# Patient Record
Sex: FEMALE | Race: WHITE | URBAN - METROPOLITAN AREA
[De-identification: names, ages, dates, MRNs, and addresses within clinical notes are randomized per-mention and may not be internally consistent; named-entity substitution may affect disease eponyms.]

---

## 2023-03-24 LAB
ANION GAP IN SER/PLAS: 10 MMOL/L (ref 10–20)
BASOPHILS (10*3/UL) IN BLOOD BY AUTOMATED COUNT: 0.04 X10E9/L (ref 0–0.1)
BASOPHILS/100 LEUKOCYTES IN BLOOD BY AUTOMATED COUNT: 0.8 % (ref 0–2)
CALCIUM (MG/DL) IN SER/PLAS: 9.3 MG/DL (ref 8.6–10.3)
CARBON DIOXIDE, TOTAL (MMOL/L) IN SER/PLAS: 26 MMOL/L (ref 21–32)
CHLORIDE (MMOL/L) IN SER/PLAS: 108 MMOL/L (ref 98–107)
CREATININE (MG/DL) IN SER/PLAS: 0.77 MG/DL (ref 0.5–1.05)
EOSINOPHILS (10*3/UL) IN BLOOD BY AUTOMATED COUNT: 0.07 X10E9/L (ref 0–0.4)
EOSINOPHILS/100 LEUKOCYTES IN BLOOD BY AUTOMATED COUNT: 1.4 % (ref 0–6)
ERYTHROCYTE DISTRIBUTION WIDTH (RATIO) BY AUTOMATED COUNT: 13.4 % (ref 11.5–14.5)
ERYTHROCYTE MEAN CORPUSCULAR HEMOGLOBIN CONCENTRATION (G/DL) BY AUTOMATED: 31.5 G/DL (ref 32–36)
ERYTHROCYTE MEAN CORPUSCULAR VOLUME (FL) BY AUTOMATED COUNT: 104 FL (ref 80–100)
ERYTHROCYTES (10*6/UL) IN BLOOD BY AUTOMATED COUNT: 3.81 X10E12/L (ref 4–5.2)
GFR FEMALE: 82 ML/MIN/1.73M2
GLUCOSE (MG/DL) IN SER/PLAS: 87 MG/DL (ref 74–99)
HEMATOCRIT (%) IN BLOOD BY AUTOMATED COUNT: 39.7 % (ref 36–46)
HEMOGLOBIN (G/DL) IN BLOOD: 12.5 G/DL (ref 12–16)
IMMATURE GRANULOCYTES/100 LEUKOCYTES IN BLOOD BY AUTOMATED COUNT: 0.2 % (ref 0–0.9)
LEUKOCYTES (10*3/UL) IN BLOOD BY AUTOMATED COUNT: 5.2 X10E9/L (ref 4.4–11.3)
LITHIUM (MOL/L) IN SER/PLAS: 0.45 MMOL/L (ref 0.6–1.2)
LYMPHOCYTES (10*3/UL) IN BLOOD BY AUTOMATED COUNT: 1.57 X10E9/L (ref 0.8–3)
LYMPHOCYTES/100 LEUKOCYTES IN BLOOD BY AUTOMATED COUNT: 30.5 % (ref 13–44)
MONOCYTES (10*3/UL) IN BLOOD BY AUTOMATED COUNT: 0.4 X10E9/L (ref 0.05–0.8)
MONOCYTES/100 LEUKOCYTES IN BLOOD BY AUTOMATED COUNT: 7.8 % (ref 2–10)
NEUTROPHILS (10*3/UL) IN BLOOD BY AUTOMATED COUNT: 3.06 X10E9/L (ref 1.6–5.5)
NEUTROPHILS/100 LEUKOCYTES IN BLOOD BY AUTOMATED COUNT: 59.3 % (ref 40–80)
PLATELETS (10*3/UL) IN BLOOD AUTOMATED COUNT: 168 X10E9/L (ref 150–450)
POTASSIUM (MMOL/L) IN SER/PLAS: 4.2 MMOL/L (ref 3.5–5.3)
SODIUM (MMOL/L) IN SER/PLAS: 140 MMOL/L (ref 136–145)
UREA NITROGEN (MG/DL) IN SER/PLAS: 15 MG/DL (ref 6–23)

## 2023-05-06 LAB — URINE CULTURE: NORMAL

## 2023-05-12 LAB — THYROTROPIN (MIU/L) IN SER/PLAS BY DETECTION LIMIT <= 0.05 MIU/L: 1.73 MIU/L (ref 0.44–3.98)

## 2023-09-13 PROBLEM — R94.30 ABNORMAL RESULTS OF CARDIOVASCULAR FUNCTION STUDIES: Status: ACTIVE | Noted: 2023-09-13

## 2023-09-13 PROBLEM — F32.A DEPRESSION: Status: ACTIVE | Noted: 2023-09-13

## 2023-09-13 PROBLEM — M85.80 OSTEOPENIA: Status: ACTIVE | Noted: 2023-09-13

## 2023-09-13 PROBLEM — D64.9 ANEMIA: Status: ACTIVE | Noted: 2023-09-13

## 2023-09-13 PROBLEM — E03.9 HYPOTHYROIDISM: Status: ACTIVE | Noted: 2023-09-13

## 2023-09-13 PROBLEM — E55.9 VITAMIN D INSUFFICIENCY: Status: ACTIVE | Noted: 2023-09-13

## 2023-09-13 PROBLEM — E78.5 HYPERLIPIDEMIA: Status: ACTIVE | Noted: 2023-09-13

## 2023-09-13 PROBLEM — F31.30 BIPOLAR AFFECTIVE DISORDER, CURRENT EPISODE DEPRESSED (MULTI): Status: ACTIVE | Noted: 2023-09-13

## 2023-09-13 PROBLEM — I10 HYPERTENSION: Status: ACTIVE | Noted: 2023-09-13

## 2023-09-13 PROBLEM — R94.6 ABNORMAL THYROID FUNCTION TEST: Status: ACTIVE | Noted: 2023-09-13

## 2023-09-13 RX ORDER — CEPHRADINE 500 MG
CAPSULE ORAL
COMMUNITY
Start: 2018-04-12

## 2023-09-13 RX ORDER — ASPIRIN 81 MG/1
1 TABLET ORAL DAILY
COMMUNITY
Start: 2013-12-31

## 2023-09-13 RX ORDER — DULOXETIN HYDROCHLORIDE 30 MG/1
CAPSULE, DELAYED RELEASE ORAL
COMMUNITY
Start: 2023-02-20 | End: 2023-10-16 | Stop reason: SDUPTHER

## 2023-09-13 RX ORDER — DULOXETIN HYDROCHLORIDE 60 MG/1
1 CAPSULE, DELAYED RELEASE ORAL 2 TIMES DAILY
COMMUNITY
Start: 2014-02-19 | End: 2023-10-16 | Stop reason: ALTCHOICE

## 2023-09-13 RX ORDER — LANOLIN ALCOHOL/MO/W.PET/CERES
1 CREAM (GRAM) TOPICAL DAILY
COMMUNITY

## 2023-09-13 RX ORDER — LOSARTAN POTASSIUM 100 MG/1
1 TABLET ORAL DAILY
COMMUNITY
Start: 2013-10-08 | End: 2024-02-14 | Stop reason: SDUPTHER

## 2023-09-13 RX ORDER — NITROFURANTOIN MONOHYDRATE/MACROCRYSTALLINE 25; 75 MG/1; MG/1
1 CAPSULE ORAL
COMMUNITY
Start: 2018-04-04

## 2023-09-13 RX ORDER — ARIPIPRAZOLE 2 MG/1
1 TABLET ORAL DAILY
COMMUNITY
Start: 2022-06-20 | End: 2023-10-16 | Stop reason: SDUPTHER

## 2023-09-13 RX ORDER — LITHIUM CARBONATE 150 MG/1
CAPSULE ORAL
COMMUNITY
End: 2023-10-16 | Stop reason: SDUPTHER

## 2023-09-13 RX ORDER — ATORVASTATIN CALCIUM 10 MG/1
TABLET, FILM COATED ORAL
COMMUNITY
Start: 2021-08-17 | End: 2023-12-22 | Stop reason: SDUPTHER

## 2023-09-13 RX ORDER — LEVOTHYROXINE SODIUM 88 UG/1
TABLET ORAL
COMMUNITY
Start: 2013-05-14 | End: 2023-10-31 | Stop reason: DRUGHIGH

## 2023-09-14 LAB
ALANINE AMINOTRANSFERASE (SGPT) (U/L) IN SER/PLAS: 16 U/L (ref 7–45)
ALBUMIN (G/DL) IN SER/PLAS: 4 G/DL (ref 3.4–5)
ALKALINE PHOSPHATASE (U/L) IN SER/PLAS: 48 U/L (ref 33–136)
ANION GAP IN SER/PLAS: 11 MMOL/L (ref 10–20)
ASPARTATE AMINOTRANSFERASE (SGOT) (U/L) IN SER/PLAS: 17 U/L (ref 9–39)
BASOPHILS (10*3/UL) IN BLOOD BY AUTOMATED COUNT: 0.05 X10E9/L (ref 0–0.1)
BASOPHILS/100 LEUKOCYTES IN BLOOD BY AUTOMATED COUNT: 1.2 % (ref 0–2)
BILIRUBIN TOTAL (MG/DL) IN SER/PLAS: 1.1 MG/DL (ref 0–1.2)
CALCIUM (MG/DL) IN SER/PLAS: 9.2 MG/DL (ref 8.6–10.3)
CARBON DIOXIDE, TOTAL (MMOL/L) IN SER/PLAS: 25 MMOL/L (ref 21–32)
CHLORIDE (MMOL/L) IN SER/PLAS: 109 MMOL/L (ref 98–107)
CREATININE (MG/DL) IN SER/PLAS: 0.77 MG/DL (ref 0.5–1.05)
DEHYDROEPIANDROSTERONE SULFATE (DHEA-S) (UG/DL) IN SER/: 29 UG/DL (ref 13–130)
EOSINOPHILS (10*3/UL) IN BLOOD BY AUTOMATED COUNT: 0.14 X10E9/L (ref 0–0.4)
EOSINOPHILS/100 LEUKOCYTES IN BLOOD BY AUTOMATED COUNT: 3.2 % (ref 0–6)
ERYTHROCYTE DISTRIBUTION WIDTH (RATIO) BY AUTOMATED COUNT: 15 % (ref 11.5–14.5)
ERYTHROCYTE MEAN CORPUSCULAR HEMOGLOBIN CONCENTRATION (G/DL) BY AUTOMATED: 30.8 G/DL (ref 32–36)
ERYTHROCYTE MEAN CORPUSCULAR VOLUME (FL) BY AUTOMATED COUNT: 108 FL (ref 80–100)
ERYTHROCYTES (10*6/UL) IN BLOOD BY AUTOMATED COUNT: 3.44 X10E12/L (ref 4–5.2)
GFR FEMALE: 82 ML/MIN/1.73M2
GLUCOSE (MG/DL) IN SER/PLAS: 82 MG/DL (ref 74–99)
HEMATOCRIT (%) IN BLOOD BY AUTOMATED COUNT: 37 % (ref 36–46)
HEMOGLOBIN (G/DL) IN BLOOD: 11.4 G/DL (ref 12–16)
IMMATURE GRANULOCYTES/100 LEUKOCYTES IN BLOOD BY AUTOMATED COUNT: 0.2 % (ref 0–0.9)
LEUKOCYTES (10*3/UL) IN BLOOD BY AUTOMATED COUNT: 4.3 X10E9/L (ref 4.4–11.3)
LITHIUM (MOL/L) IN SER/PLAS: 0.56 MMOL/L (ref 0.6–1.2)
LYMPHOCYTES (10*3/UL) IN BLOOD BY AUTOMATED COUNT: 1.52 X10E9/L (ref 0.8–3)
LYMPHOCYTES/100 LEUKOCYTES IN BLOOD BY AUTOMATED COUNT: 35 % (ref 13–44)
MONOCYTES (10*3/UL) IN BLOOD BY AUTOMATED COUNT: 0.36 X10E9/L (ref 0.05–0.8)
MONOCYTES/100 LEUKOCYTES IN BLOOD BY AUTOMATED COUNT: 8.3 % (ref 2–10)
NEUTROPHILS (10*3/UL) IN BLOOD BY AUTOMATED COUNT: 2.26 X10E9/L (ref 1.6–5.5)
NEUTROPHILS/100 LEUKOCYTES IN BLOOD BY AUTOMATED COUNT: 52.1 % (ref 40–80)
PLATELETS (10*3/UL) IN BLOOD AUTOMATED COUNT: 180 X10E9/L (ref 150–450)
POTASSIUM (MMOL/L) IN SER/PLAS: 4.1 MMOL/L (ref 3.5–5.3)
PROTEIN TOTAL: 6.6 G/DL (ref 6.4–8.2)
SODIUM (MMOL/L) IN SER/PLAS: 141 MMOL/L (ref 136–145)
THYROTROPIN (MIU/L) IN SER/PLAS BY DETECTION LIMIT <= 0.05 MIU/L: 2.98 MIU/L (ref 0.44–3.98)
UREA NITROGEN (MG/DL) IN SER/PLAS: 11 MG/DL (ref 6–23)

## 2023-10-04 ENCOUNTER — LAB (OUTPATIENT)
Dept: LAB | Facility: LAB | Age: 73
End: 2023-10-04
Payer: MEDICARE

## 2023-10-04 DIAGNOSIS — Z00.00 ENCOUNTER FOR GENERAL ADULT MEDICAL EXAMINATION WITHOUT ABNORMAL FINDINGS: ICD-10-CM

## 2023-10-04 DIAGNOSIS — E03.9 HYPOTHYROIDISM, UNSPECIFIED: ICD-10-CM

## 2023-10-04 DIAGNOSIS — E66.9 OBESITY, UNSPECIFIED: ICD-10-CM

## 2023-10-04 DIAGNOSIS — F31.30 BIPOLAR DISORDER, CURRENT EPISODE DEPRESSED, MILD OR MODERATE SEVERITY, UNSPECIFIED (MULTI): Primary | ICD-10-CM

## 2023-10-04 DIAGNOSIS — E78.5 HYPERLIPIDEMIA, UNSPECIFIED: ICD-10-CM

## 2023-10-04 DIAGNOSIS — I10 ESSENTIAL (PRIMARY) HYPERTENSION: ICD-10-CM

## 2023-10-04 LAB
ALBUMIN SERPL BCP-MCNC: 4 G/DL (ref 3.4–5)
ALP SERPL-CCNC: 51 U/L (ref 33–136)
ALT SERPL W P-5'-P-CCNC: 12 U/L (ref 7–45)
ANION GAP SERPL CALC-SCNC: 11 MMOL/L (ref 10–20)
AST SERPL W P-5'-P-CCNC: 14 U/L (ref 9–39)
BASOPHILS # BLD AUTO: 0.04 X10*3/UL (ref 0–0.1)
BASOPHILS NFR BLD AUTO: 0.9 %
BILIRUB SERPL-MCNC: 1.1 MG/DL (ref 0–1.2)
BUN SERPL-MCNC: 20 MG/DL (ref 6–23)
CALCIUM SERPL-MCNC: 9.8 MG/DL (ref 8.6–10.6)
CHLORIDE SERPL-SCNC: 109 MMOL/L (ref 98–107)
CHOLEST SERPL-MCNC: 174 MG/DL (ref 0–199)
CHOLESTEROL/HDL RATIO: 2
CO2 SERPL-SCNC: 24 MMOL/L (ref 21–32)
CREAT SERPL-MCNC: 0.74 MG/DL (ref 0.5–1.05)
EOSINOPHIL # BLD AUTO: 0.07 X10*3/UL (ref 0–0.4)
EOSINOPHIL NFR BLD AUTO: 1.5 %
ERYTHROCYTE [DISTWIDTH] IN BLOOD BY AUTOMATED COUNT: 14.3 % (ref 11.5–14.5)
GFR SERPL CREATININE-BSD FRML MDRD: 87 ML/MIN/1.73M*2
GLUCOSE SERPL-MCNC: 88 MG/DL (ref 74–99)
HCT VFR BLD AUTO: 39.5 % (ref 36–46)
HDLC SERPL-MCNC: 85.2 MG/DL
HGB BLD-MCNC: 12.2 G/DL (ref 12–16)
IMM GRANULOCYTES # BLD AUTO: 0.01 X10*3/UL (ref 0–0.5)
IMM GRANULOCYTES NFR BLD AUTO: 0.2 % (ref 0–0.9)
LDLC SERPL CALC-MCNC: 74 MG/DL (ref 140–190)
LITHIUM SERPL-SCNC: 0.65 MMOL/L (ref 0.6–1.2)
LYMPHOCYTES # BLD AUTO: 1.26 X10*3/UL (ref 0.8–3)
LYMPHOCYTES NFR BLD AUTO: 27.2 %
MCH RBC QN AUTO: 33 PG (ref 26–34)
MCHC RBC AUTO-ENTMCNC: 30.9 G/DL (ref 32–36)
MCV RBC AUTO: 107 FL (ref 80–100)
MONOCYTES # BLD AUTO: 0.34 X10*3/UL (ref 0.05–0.8)
MONOCYTES NFR BLD AUTO: 7.3 %
NEUTROPHILS # BLD AUTO: 2.92 X10*3/UL (ref 1.6–5.5)
NEUTROPHILS NFR BLD AUTO: 62.9 %
NON HDL CHOLESTEROL: 89 MG/DL (ref 0–149)
NRBC BLD-RTO: 0 /100 WBCS (ref 0–0)
PLATELET # BLD AUTO: 154 X10*3/UL (ref 150–450)
PMV BLD AUTO: 11.8 FL (ref 7.5–11.5)
POTASSIUM SERPL-SCNC: 4.4 MMOL/L (ref 3.5–5.3)
PROT SERPL-MCNC: 6.3 G/DL (ref 6.4–8.2)
RBC # BLD AUTO: 3.7 X10*6/UL (ref 4–5.2)
SODIUM SERPL-SCNC: 140 MMOL/L (ref 136–145)
TRIGL SERPL-MCNC: 74 MG/DL (ref 0–149)
TSH SERPL-ACNC: 2.23 MIU/L (ref 0.44–3.98)
VLDL: 15 MG/DL (ref 0–40)
WBC # BLD AUTO: 4.6 X10*3/UL (ref 4.4–11.3)

## 2023-10-04 PROCEDURE — 80050 GENERAL HEALTH PANEL: CPT

## 2023-10-04 PROCEDURE — 36415 COLL VENOUS BLD VENIPUNCTURE: CPT

## 2023-10-04 PROCEDURE — 80178 ASSAY OF LITHIUM: CPT

## 2023-10-04 PROCEDURE — 80061 LIPID PANEL: CPT

## 2023-10-16 ENCOUNTER — OFFICE VISIT (OUTPATIENT)
Dept: BEHAVIORAL HEALTH | Facility: CLINIC | Age: 73
End: 2023-10-16
Payer: MEDICARE

## 2023-10-16 VITALS
HEIGHT: 66 IN | RESPIRATION RATE: 18 BRPM | BODY MASS INDEX: 29.82 KG/M2 | DIASTOLIC BLOOD PRESSURE: 81 MMHG | TEMPERATURE: 97 F | HEART RATE: 64 BPM | SYSTOLIC BLOOD PRESSURE: 130 MMHG | WEIGHT: 185.56 LBS

## 2023-10-16 DIAGNOSIS — F31.30 BIPOLAR AFFECTIVE DISORDER, CURRENT EPISODE DEPRESSED, CURRENT EPISODE SEVERITY UNSPECIFIED (MULTI): Primary | ICD-10-CM

## 2023-10-16 PROCEDURE — 1126F AMNT PAIN NOTED NONE PRSNT: CPT | Performed by: PSYCHIATRY & NEUROLOGY

## 2023-10-16 PROCEDURE — 3079F DIAST BP 80-89 MM HG: CPT | Performed by: PSYCHIATRY & NEUROLOGY

## 2023-10-16 PROCEDURE — 99214 OFFICE O/P EST MOD 30 MIN: CPT | Performed by: PSYCHIATRY & NEUROLOGY

## 2023-10-16 PROCEDURE — 3075F SYST BP GE 130 - 139MM HG: CPT | Performed by: PSYCHIATRY & NEUROLOGY

## 2023-10-16 RX ORDER — LITHIUM CARBONATE 150 MG/1
CAPSULE ORAL
Qty: 360 CAPSULE | Refills: 1 | Status: SHIPPED | OUTPATIENT
Start: 2023-10-16 | End: 2024-03-18 | Stop reason: SDUPTHER

## 2023-10-16 RX ORDER — DULOXETIN HYDROCHLORIDE 30 MG/1
CAPSULE, DELAYED RELEASE ORAL
Qty: 180 CAPSULE | Refills: 1 | Status: SHIPPED | OUTPATIENT
Start: 2023-10-16 | End: 2024-03-18 | Stop reason: SDUPTHER

## 2023-10-16 RX ORDER — ARIPIPRAZOLE 2 MG/1
2 TABLET ORAL DAILY
Qty: 90 TABLET | Refills: 1 | Status: SHIPPED | OUTPATIENT
Start: 2023-10-16 | End: 2024-03-18 | Stop reason: SDUPTHER

## 2023-10-16 NOTE — PROGRESS NOTES
HPI: Ms. Li is assessed today in person. She accompanied by her , Tim. She  feels that her mood is good.   She had blood work done on 10/4. Her serum lithium level is within excellent range. Kidney and renal functioning are both good.    She describes her mood as a “8 or 9” on a 1-10 scale (10 is best). Her  concurs.   Since she saw me she saw her endocrinologist. I have reviewed this clinical summary    When I saw her last June we reduced her Cymbalta to 30 mg in the morning and 60 mg at bedtime. She did very well with this and does not feel any worse at all.       ROS:  She saw her family medicine group coverage clinician in Sept for bilateral swelling. Work up was negative with negative ultrasound  No falls  Energy level is fair  Appetite is good  Weight has been stable. She lost some weight prior to a wedding but has gained it back again.     Appetite: XX Normal/Unchanged  __Increase  _Decrease  SI:  ___ Present  XXAbsent  Sleep: XNormal/Unchanged  __ Increase _Decrease  HI:  ___ Present  XX Absent  Energy: X Normal/Unchanged  __ Increase _ Decrease  Plan:  ___ Present  XX Absent  Patient is: ___ able ___ not able to contract for safety  XXN/A Aggression:   ___ Yes       XX No  Medication Side Effects (SE):  __ None (Psych. Meds.)  ___ Other ___    Explain positives below.     Constitutional :   Eyes            Pos___  Ears/Nose/Mouth/Throat     Pos__________  CV          Pos__________  Respiratory          Pos__________  GI        Pos__________       Pos_   Musculoskeletal        Pos. Has been getting cortisone shots for the  chronic pain in her shoulder.  Gets shots every couple of months  Skin/Breast         Pos__________  Neurological         Pos__________  Endocrine      Pos__________  Heme/Lymph         Pos__________  Allergic Immunologic   Pos__________      Medications:  Lithium 150 mg. She is taking 4 tablets at bedtime  Duloxetine 30 mg in the morning and 60 mg at  bedtimzulay  Abilify 2 mg/day    Social history: She is enjoying her snf    MSE:  General impression: Alert/bright spontaneous. Engaged in the interview.   Speech: clear, coherent. Her speech is normal rate/rhythm  Cognition: grossly intact.   Mood: Good/upbeat.   She describes here mood as an “8” on a 1-10 scale (10 is the best).  Her  agrees with this.  Thought form/content: no psychotic symptoms. She denies hallucinations. Thinking is linear and goal-oriented  Cognition: Grossly intact  Fund of knowledge: excellent  Insight: good  Mrs. ROJAS does not appear to be an acute risk of harm to self or others. She has no thoughts that life is not worth living.      TREATMENT/PLAN: XX Continue Current Medications.  . ___Continue Follow-Up ___Continue Labs ___ Other    PATIENT RESPONSE TO TREATMENT: Excellent response to lithium.   Risks, benefits, Side Effects, Drug-to-Drug Interactions and Alternatives to treatment were discussed in my usual manner: ____ XXYes ____No    Reason for not reducing medication dose(s):       ___XX_N/A __ High risk of patient's deterioration ___ Medication recently reduced      ___Prior Medication Dose Reduction Unsuccessful ____Other     Complexity Issues:   # of diagnoses or management options:  ____Limited   XXMultiple     Problems: (gait, hearing, vision, etc.) effect on treatment and management:   ____Yes      XX No    Risk of complications and/or morbidity or mortality: ____None    ____ Limited   XX Moderate  ____  Severe    Topics discussed:   X_Nature of diagnosis and/or prognosis                                                           X  Medical records reviewed  X_Aspects of aging process and relationship to the current problem               ___Communication with patient's Dr Zhong of possible treatment options/drug drug interaction                         ___Communication with facility staff  X_Risk of non-treatment                                                                                    _X_Communication with family/caregiver  __X_Psychopharmacologic treatment options/possible benefits and risks           ___Referred for psychotherapy  ___Nature of, reasons for and possible benefits from psychotherapy               ___ Forms/reports filled out  ___Family and/or situational stressors                                                               ___Other  X Behavioral and/or environmental changed that might help      Impression:  Type 1 Bipolar disorder with euthymic mood. She is doing well on low-dose Abilify (2 mg/day).  In March we slightly reduced her Cymbalta and I believe that it is reasonable to reduce it still a bit further given her age and clinical status. I would like her to get repeat blood work in approximately 4-5 months. I discussed this with Annette and her  and both agre3. Total visit today 30 mg/day      Plan for Mrs. Annette Li as June 5, 2023:   Reduce duloxetine (Cymbalta) to 30 mg twice daily  Continue lithium 150 mg/Take 4 tablets in the evening/bedtime   Continue Abilify (aripiprazole) 2 mg/day  Get blood work done: basic metabolic panel, CDC with differential, lithium level approximately 2-3 weeks prior to your next visit with me  Follow up with Dr. Sharp in 4-5 months for an in-person visit

## 2023-10-31 ENCOUNTER — OFFICE VISIT (OUTPATIENT)
Dept: PRIMARY CARE | Facility: CLINIC | Age: 73
End: 2023-10-31
Payer: MEDICARE

## 2023-10-31 VITALS
TEMPERATURE: 97.9 F | HEART RATE: 63 BPM | DIASTOLIC BLOOD PRESSURE: 86 MMHG | SYSTOLIC BLOOD PRESSURE: 130 MMHG | BODY MASS INDEX: 30.08 KG/M2 | HEIGHT: 66 IN | WEIGHT: 187.2 LBS

## 2023-10-31 DIAGNOSIS — R60.0 BILATERAL LEG EDEMA: Primary | ICD-10-CM

## 2023-10-31 PROCEDURE — 3079F DIAST BP 80-89 MM HG: CPT | Performed by: FAMILY MEDICINE

## 2023-10-31 PROCEDURE — 1160F RVW MEDS BY RX/DR IN RCRD: CPT | Performed by: FAMILY MEDICINE

## 2023-10-31 PROCEDURE — 99213 OFFICE O/P EST LOW 20 MIN: CPT | Performed by: FAMILY MEDICINE

## 2023-10-31 PROCEDURE — 1126F AMNT PAIN NOTED NONE PRSNT: CPT | Performed by: FAMILY MEDICINE

## 2023-10-31 PROCEDURE — 1036F TOBACCO NON-USER: CPT | Performed by: FAMILY MEDICINE

## 2023-10-31 PROCEDURE — 3075F SYST BP GE 130 - 139MM HG: CPT | Performed by: FAMILY MEDICINE

## 2023-10-31 PROCEDURE — 1159F MED LIST DOCD IN RCRD: CPT | Performed by: FAMILY MEDICINE

## 2023-10-31 RX ORDER — LEVOTHYROXINE SODIUM 100 UG/1
100 TABLET ORAL
COMMUNITY
End: 2024-04-19 | Stop reason: SDUPTHER

## 2023-10-31 ASSESSMENT — ENCOUNTER SYMPTOMS
HEMATOLOGIC/LYMPHATIC NEGATIVE: 1
GASTROINTESTINAL NEGATIVE: 1
ENDOCRINE NEGATIVE: 1
ALLERGIC/IMMUNOLOGIC NEGATIVE: 1
CONSTITUTIONAL NEGATIVE: 1
MUSCULOSKELETAL NEGATIVE: 1
RESPIRATORY NEGATIVE: 1
EYES NEGATIVE: 1
PSYCHIATRIC NEGATIVE: 1
NEUROLOGICAL NEGATIVE: 1

## 2023-10-31 NOTE — PROGRESS NOTES
Bri Bryant is here for follow-up on lower leg edema.  Around September 9, 2023 she was doing a lot of walking on uneven surfaces leading in the next day noticed swelling of both ankles and feet.  She went to a med center and was told to wear compression stockings which did help improve the swelling.  She then saw Dr. Chris Garcia who ordered a venous duplex scan which was normal and labs which were normal as noted.  At this point the swelling is much better and she has been using compression stockings rarely.  There does seem to be some swelling towards the end of the day which resolves by morning.  She denies any chest pain or shortness of breath.  Past medical and social histories noted.  She continues on her meds noted.    Patient ID: Annette Li is a 72 y.o. female who presents for Follow-up (Follow up on bilateral ankle and feet swelling.  She went to Urgent care, then saw Dr Anthony, in which an ultrasound was done.  She told by both to wear compression stockings to reduce swelling.  It did help.  She no longer is having swelling and no longer wearing the stocking.):    Problem List Items Addressed This Visit    None     Past Medical History:   Diagnosis Date    Atherosclerotic heart disease of native coronary artery without angina pectoris     Coronary artery disease    Body mass index (BMI)30.0-30.9, adult     BMI 30.0-30.9,adult    Chronic sinusitis, unspecified 09/25/2017    Sinobronchitis    Personal history of other diseases of the nervous system and sense organs     History of sleep apnea    Personal history of other endocrine, nutritional and metabolic disease     History of hypothyroidism    Personal history of other endocrine, nutritional and metabolic disease     History of obesity    Unspecified asthma, uncomplicated 02/14/2018    Asthmatic bronchitis      Past Surgical History:   Procedure Laterality Date    CHOLECYSTECTOMY  07/31/2014    Cholecystectomy    OTHER SURGICAL HISTORY   07/31/2014    Oophorectomy Unilateral Left Side    OTHER SURGICAL HISTORY  02/01/2022    Gallbladder surgery    OTHER SURGICAL HISTORY  03/25/2022    Complete colonoscopy      Family History   Problem Relation Name Age of Onset    Heart block Mother      Hypertension Mother      Other (systemic lupus erythematosus) Mother      Other (cardiac disorder) Father      Diabetes Father      Hypertension Father      Other (systemic lupus erthematossus) Father        Social History     Socioeconomic History    Marital status:      Spouse name: Not on file    Number of children: Not on file    Years of education: Not on file    Highest education level: Not on file   Occupational History    Not on file   Tobacco Use    Smoking status: Never    Smokeless tobacco: Never   Substance and Sexual Activity    Alcohol use: Yes     Comment: occassional    Drug use: Never    Sexual activity: Not on file   Other Topics Concern    Not on file   Social History Narrative    Not on file     Social Determinants of Health     Financial Resource Strain: Not on file   Food Insecurity: Not on file   Transportation Needs: Not on file   Physical Activity: Not on file   Stress: Not on file   Social Connections: Not on file   Intimate Partner Violence: Not on file   Housing Stability: Not on file      Divalproex and Morphine   Current Outpatient Medications   Medication Sig Dispense Refill    ARIPiprazole (Abilify) 2 mg tablet Take 1 tablet (2 mg) by mouth once daily. 90 tablet 1    aspirin 81 mg EC tablet Take 1 tablet (81 mg) by mouth once daily.      atorvastatin (Lipitor) 10 mg tablet Take by mouth.      cholecalciferol, vitamin D3, 250 mcg (10,000 unit) capsule Take by mouth.      cyanocobalamin (Vitamin B-12) 1,000 mcg tablet Take 1 tablet (1,000 mcg) by mouth once daily.      DULoxetine (Cymbalta) 30 mg DR capsule Take 1 twice daily 180 capsule 1    levothyroxine (Synthroid, Levoxyl) 100 mcg tablet Take 1 tablet (100 mcg) by mouth once  daily in the morning. Take before meals.      lithium 150 mg capsule Take 4 at bedtime 360 capsule 1    losartan (Cozaar) 100 mg tablet Take 1 tablet (100 mg) by mouth once daily.      Macrobid 100 mg capsule Take 1 capsule (100 mg) by mouth 1 (one) time per week.       No current facility-administered medications for this visit.       Immunization History   Administered Date(s) Administered    Flu vaccine (IIV4), preservative free *Check age/dose* 09/13/2021    Flu vaccine, quadrivalent, high-dose, preservative free, age 65y+ (FLUZONE) 10/06/2022, 10/08/2023    Influenza, Unspecified 10/01/2021    Influenza, seasonal, injectable 10/01/2020    Pfizer COVID-19 vaccine, bivalent, age 12 years and older (30 mcg/0.3 mL) 10/06/2022    Pfizer Gray Cap SARS-CoV-2 06/24/2022    Pfizer Purple Cap SARS-CoV-2 02/22/2021, 03/13/2021, 04/02/2021, 11/05/2021, 11/17/2021, 04/13/2022    Pneumococcal conjugate vaccine, 13-valent (PREVNAR 13) 09/15/2017    Pneumococcal conjugate vaccine, 20-valent (PREVNAR 20) 05/13/2023    Pneumococcal polysaccharide vaccine, 23-valent, age 2 years and older (PNEUMOVAX 23) 10/15/2018    Tdap vaccine, age 7 year and older (BOOSTRIX) 11/04/2014    Zoster vaccine, recombinant, adult (SHINGRIX) 05/13/2023, 10/13/2023        Review of Systems   Constitutional: Negative.    HENT: Negative.     Eyes: Negative.    Respiratory: Negative.     Gastrointestinal: Negative.    Endocrine: Negative.    Genitourinary: Negative.    Musculoskeletal: Negative.    Allergic/Immunologic: Negative.    Neurological: Negative.    Hematological: Negative.    Psychiatric/Behavioral: Negative.     All other systems reviewed and are negative.       Vitals:    10/31/23 1140   BP: 130/86   Pulse: 63   Temp: 36.6 °C (97.9 °F)     Vitals:    10/31/23 1140   Weight: 84.9 kg (187 lb 3.2 oz)      Physical Exam  Constitutional:       General: She is not in acute distress.     Appearance: Normal appearance.   Cardiovascular:      Pulses:  Normal pulses.      Heart sounds: Normal heart sounds. No murmur heard.     No friction rub. No gallop.   Pulmonary:      Effort: Pulmonary effort is normal.      Breath sounds: Normal breath sounds. No wheezing or rales.   Neurological:      Mental Status: She is alert.     Right lower leg-there is mild edema just above the ankle area where her sock line was.  There is no actual swelling in the foot region.  There is no redness or induration.    ASSESSMENT/PLAN: Bilateral lower leg edema improved    Plan-we discussed her normal venous duplex scan.  We also discussed the potential for chronic venous insufficiency.  Discussed using knee-high compression stockings daily basis.  Elevate legs when possible.  Continue current medications  Call if swelling recurs or worsens.  Patient will be seeing her cardiologist in the next 1 to 2 months.  At this point there is no sign of heart failure.  Follow-up in 2 months and call as needed

## 2023-11-22 ENCOUNTER — LAB (OUTPATIENT)
Dept: LAB | Facility: LAB | Age: 73
End: 2023-11-22
Payer: MEDICARE

## 2023-11-22 DIAGNOSIS — N39.0 URINARY TRACT INFECTION, SITE NOT SPECIFIED: Primary | ICD-10-CM

## 2023-11-22 LAB
APPEARANCE UR: CLEAR
BACTERIA #/AREA URNS AUTO: ABNORMAL /HPF
BILIRUB UR STRIP.AUTO-MCNC: NEGATIVE MG/DL
COLOR UR: YELLOW
GLUCOSE UR STRIP.AUTO-MCNC: NEGATIVE MG/DL
KETONES UR STRIP.AUTO-MCNC: NEGATIVE MG/DL
LEUKOCYTE ESTERASE UR QL STRIP.AUTO: ABNORMAL
MUCOUS THREADS #/AREA URNS AUTO: ABNORMAL /LPF
NITRITE UR QL STRIP.AUTO: NEGATIVE
PH UR STRIP.AUTO: 7 [PH]
PROT UR STRIP.AUTO-MCNC: NEGATIVE MG/DL
RBC # UR STRIP.AUTO: ABNORMAL /UL
RBC #/AREA URNS AUTO: ABNORMAL /HPF
SP GR UR STRIP.AUTO: 1.01
UROBILINOGEN UR STRIP.AUTO-MCNC: <2 MG/DL
WBC #/AREA URNS AUTO: ABNORMAL /HPF

## 2023-11-22 PROCEDURE — 81001 URINALYSIS AUTO W/SCOPE: CPT

## 2023-11-22 PROCEDURE — 87086 URINE CULTURE/COLONY COUNT: CPT

## 2023-11-24 LAB — BACTERIA UR CULT: NORMAL

## 2023-12-22 ENCOUNTER — OFFICE VISIT (OUTPATIENT)
Dept: PRIMARY CARE | Facility: CLINIC | Age: 73
End: 2023-12-22
Payer: MEDICARE

## 2023-12-22 VITALS
HEIGHT: 66 IN | DIASTOLIC BLOOD PRESSURE: 84 MMHG | HEART RATE: 65 BPM | BODY MASS INDEX: 30.74 KG/M2 | OXYGEN SATURATION: 98 % | SYSTOLIC BLOOD PRESSURE: 116 MMHG | WEIGHT: 191.25 LBS | TEMPERATURE: 97.3 F

## 2023-12-22 DIAGNOSIS — G47.30 SLEEP APNEA, UNSPECIFIED TYPE: ICD-10-CM

## 2023-12-22 DIAGNOSIS — E78.5 HYPERLIPIDEMIA, UNSPECIFIED HYPERLIPIDEMIA TYPE: ICD-10-CM

## 2023-12-22 DIAGNOSIS — Z00.00 MEDICARE ANNUAL WELLNESS VISIT, SUBSEQUENT: ICD-10-CM

## 2023-12-22 DIAGNOSIS — Z78.9 NEVER SMOKED CIGARETTES: ICD-10-CM

## 2023-12-22 PROCEDURE — 1160F RVW MEDS BY RX/DR IN RCRD: CPT | Performed by: FAMILY MEDICINE

## 2023-12-22 PROCEDURE — 3074F SYST BP LT 130 MM HG: CPT | Performed by: FAMILY MEDICINE

## 2023-12-22 PROCEDURE — 1159F MED LIST DOCD IN RCRD: CPT | Performed by: FAMILY MEDICINE

## 2023-12-22 PROCEDURE — 1170F FXNL STATUS ASSESSED: CPT | Performed by: FAMILY MEDICINE

## 2023-12-22 PROCEDURE — 1036F TOBACCO NON-USER: CPT | Performed by: FAMILY MEDICINE

## 2023-12-22 PROCEDURE — 1126F AMNT PAIN NOTED NONE PRSNT: CPT | Performed by: FAMILY MEDICINE

## 2023-12-22 PROCEDURE — 3079F DIAST BP 80-89 MM HG: CPT | Performed by: FAMILY MEDICINE

## 2023-12-22 PROCEDURE — G0439 PPPS, SUBSEQ VISIT: HCPCS | Performed by: FAMILY MEDICINE

## 2023-12-22 PROCEDURE — 3008F BODY MASS INDEX DOCD: CPT | Performed by: FAMILY MEDICINE

## 2023-12-22 RX ORDER — ATORVASTATIN CALCIUM 10 MG/1
10 TABLET, FILM COATED ORAL DAILY
Qty: 90 TABLET | Refills: 1 | Status: SHIPPED | OUTPATIENT
Start: 2023-12-22

## 2023-12-22 ASSESSMENT — PATIENT HEALTH QUESTIONNAIRE - PHQ9
2. FEELING DOWN, DEPRESSED OR HOPELESS: NOT AT ALL
2. FEELING DOWN, DEPRESSED OR HOPELESS: NOT AT ALL
SUM OF ALL RESPONSES TO PHQ9 QUESTIONS 1 AND 2: 0
1. LITTLE INTEREST OR PLEASURE IN DOING THINGS: NOT AT ALL
1. LITTLE INTEREST OR PLEASURE IN DOING THINGS: NOT AT ALL
SUM OF ALL RESPONSES TO PHQ9 QUESTIONS 1 AND 2: 0

## 2023-12-22 ASSESSMENT — ACTIVITIES OF DAILY LIVING (ADL)
DOING_HOUSEWORK: INDEPENDENT
MANAGING_FINANCES: INDEPENDENT
GROCERY_SHOPPING: INDEPENDENT
TAKING_MEDICATION: INDEPENDENT
BATHING: INDEPENDENT
DRESSING: INDEPENDENT

## 2023-12-23 ASSESSMENT — ENCOUNTER SYMPTOMS
CARDIOVASCULAR NEGATIVE: 1
NEUROLOGICAL NEGATIVE: 1
RESPIRATORY NEGATIVE: 1
PSYCHIATRIC NEGATIVE: 1
MUSCULOSKELETAL NEGATIVE: 1
ALLERGIC/IMMUNOLOGIC NEGATIVE: 1
EYES NEGATIVE: 1
HEMATOLOGIC/LYMPHATIC NEGATIVE: 1
ENDOCRINE NEGATIVE: 1
CONSTITUTIONAL NEGATIVE: 1
GASTROINTESTINAL NEGATIVE: 1

## 2023-12-23 NOTE — PROGRESS NOTES
Subjective is here for her annual wellness visit.  She states that she is feeling well and has no new complaints at the present time.  Her lower extremity swelling noted 2 or 3 months ago has resolved.  She continues on her medications as noted.  She does see her psychiatrist and endocrinologist as scheduled.  She also sees Dr. Majano for cardiology care.  She continues on her meds noted she is scheduled for colonoscopy January 2024.  She does have a history of sleep apnea but cannot tolerate CPAP masks.    Patient ID: Annette Li is a 73 y.o. female who presents for Annual Exam (Patient in office today for AWV. ):    Problem List Items Addressed This Visit       Hyperlipidemia    Relevant Medications    atorvastatin (Lipitor) 10 mg tablet     Other Visit Diagnoses       Medicare annual wellness visit, subsequent        BMI 30.0-30.9,adult        Never smoked cigarettes        Sleep apnea, unspecified type        Relevant Orders    Referral to Adult Sleep Medicine           Past Medical History:   Diagnosis Date    Atherosclerotic heart disease of native coronary artery without angina pectoris     Coronary artery disease    Body mass index (BMI)30.0-30.9, adult     BMI 30.0-30.9,adult    Chronic sinusitis, unspecified 09/25/2017    Sinobronchitis    Personal history of other diseases of the nervous system and sense organs     History of sleep apnea    Personal history of other endocrine, nutritional and metabolic disease     History of hypothyroidism    Personal history of other endocrine, nutritional and metabolic disease     History of obesity    Unspecified asthma, uncomplicated 02/14/2018    Asthmatic bronchitis      Past Surgical History:   Procedure Laterality Date    CHOLECYSTECTOMY  07/31/2014    Cholecystectomy    OTHER SURGICAL HISTORY  07/31/2014    Oophorectomy Unilateral Left Side    OTHER SURGICAL HISTORY  02/01/2022    Gallbladder surgery    OTHER SURGICAL HISTORY  03/25/2022    Complete colonoscopy       Family History   Problem Relation Name Age of Onset    Heart block Mother      Hypertension Mother      Other (systemic lupus erythematosus) Mother      Other (cardiac disorder) Father      Diabetes Father      Hypertension Father      Other (systemic lupus erthematossus) Father        Social History     Socioeconomic History    Marital status:      Spouse name: Not on file    Number of children: Not on file    Years of education: Not on file    Highest education level: Not on file   Occupational History    Not on file   Tobacco Use    Smoking status: Never    Smokeless tobacco: Never   Substance and Sexual Activity    Alcohol use: Yes     Comment: occassional    Drug use: Never    Sexual activity: Not on file   Other Topics Concern    Not on file   Social History Narrative    Not on file     Social Determinants of Health     Financial Resource Strain: Not on file   Food Insecurity: Not on file   Transportation Needs: Not on file   Physical Activity: Not on file   Stress: Not on file   Social Connections: Not on file   Intimate Partner Violence: Not on file   Housing Stability: Not on file      Divalproex and Morphine   Current Outpatient Medications   Medication Sig Dispense Refill    ARIPiprazole (Abilify) 2 mg tablet Take 1 tablet (2 mg) by mouth once daily. 90 tablet 1    aspirin 81 mg EC tablet Take 1 tablet (81 mg) by mouth once daily.      cholecalciferol, vitamin D3, 250 mcg (10,000 unit) capsule Take by mouth.      cyanocobalamin (Vitamin B-12) 1,000 mcg tablet Take 1 tablet (1,000 mcg) by mouth once daily.      DULoxetine (Cymbalta) 30 mg DR capsule Take 1 twice daily 180 capsule 1    levothyroxine (Synthroid, Levoxyl) 100 mcg tablet Take 1 tablet (100 mcg) by mouth once daily in the morning. Take before meals.      lithium 150 mg capsule Take 4 at bedtime 360 capsule 1    losartan (Cozaar) 100 mg tablet Take 1 tablet (100 mg) by mouth once daily.      Macrobid 100 mg capsule Take 1 capsule  (100 mg) by mouth 1 (one) time per week.      atorvastatin (Lipitor) 10 mg tablet Take 1 tablet (10 mg) by mouth once daily. 90 tablet 1     No current facility-administered medications for this visit.       Immunization History   Administered Date(s) Administered    Flu vaccine (IIV4), preservative free *Check age/dose* 09/13/2021    Flu vaccine, quadrivalent, high-dose, preservative free, age 65y+ (FLUZONE) 10/06/2022, 10/08/2023    Influenza, Unspecified 10/01/2021    Influenza, seasonal, injectable 10/01/2020    Pfizer COVID-19 vaccine, Fall 2023, 12 years and older, (30mcg/0.3mL) 10/30/2023    Pfizer COVID-19 vaccine, bivalent, age 12 years and older (30 mcg/0.3 mL) 10/06/2022    Pfizer Gray Cap SARS-CoV-2 06/24/2022    Pfizer Purple Cap SARS-CoV-2 02/22/2021, 03/13/2021, 04/02/2021, 11/05/2021, 11/17/2021, 04/13/2022    Pneumococcal conjugate vaccine, 13-valent (PREVNAR 13) 09/15/2017    Pneumococcal conjugate vaccine, 20-valent (PREVNAR 20) 05/13/2023    Pneumococcal polysaccharide vaccine, 23-valent, age 2 years and older (PNEUMOVAX 23) 10/15/2018    Tdap vaccine, age 7 year and older (BOOSTRIX) 11/04/2014    Zoster vaccine, recombinant, adult (SHINGRIX) 05/13/2023, 10/13/2023        Review of Systems   Constitutional: Negative.    HENT: Negative.     Eyes: Negative.    Respiratory: Negative.     Cardiovascular: Negative.    Gastrointestinal: Negative.    Endocrine: Negative.    Genitourinary: Negative.    Musculoskeletal: Negative.    Skin: Negative.    Allergic/Immunologic: Negative.    Neurological: Negative.    Hematological: Negative.    Psychiatric/Behavioral: Negative.     All other systems reviewed and are negative.       Vitals:    12/22/23 1113   BP: 116/84   Pulse: 65   Temp: 36.3 °C (97.3 °F)   SpO2: 98%     Vitals:    12/22/23 1113   Weight: 86.8 kg (191 lb 4 oz)      Physical Exam  Constitutional:       General: She is not in acute distress.     Appearance: Normal appearance.   Neck:       Vascular: No carotid bruit.   Cardiovascular:      Rate and Rhythm: Normal rate and regular rhythm.      Pulses: Normal pulses.      Heart sounds: Normal heart sounds. No murmur heard.     No friction rub. No gallop.   Pulmonary:      Effort: Pulmonary effort is normal.      Breath sounds: Normal breath sounds. No wheezing or rales.   Musculoskeletal:      Cervical back: Neck supple.   Neurological:      Mental Status: She is alert.   Psychiatric:         Mood and Affect: Mood normal.         Thought Content: Thought content normal.          ASSESSMENT/PLAN: Annual wellness visit  Hypertension stable  Hyperlipidemia stable with cholesterol 174 and LDL 74  Hypothyroid  Bipolar disorder followed by psychiatry  Obstructive sleep apnea    Plan-continue current meds noted  Patient referred to  sleep medicine for evaluation of sleep apnea.  Proceed with colonoscopy as directed  Mammograms up-to-date per gynecology.  Follow-up with psychiatry endocrinology and cardiology as recommended exercise regularly  Immunizations up-to-date.  Recommended RSV vaccine  Exercise regularly  Follow-up in 6 months and call as needed

## 2024-01-19 ENCOUNTER — APPOINTMENT (OUTPATIENT)
Dept: ENDOCRINOLOGY | Facility: CLINIC | Age: 74
End: 2024-01-19
Payer: MEDICARE

## 2024-02-14 DIAGNOSIS — I10 PRIMARY HYPERTENSION: Primary | ICD-10-CM

## 2024-02-14 RX ORDER — LOSARTAN POTASSIUM 100 MG/1
100 TABLET ORAL DAILY
Qty: 90 TABLET | Refills: 1 | Status: SHIPPED | OUTPATIENT
Start: 2024-02-14

## 2024-02-26 ENCOUNTER — LAB (OUTPATIENT)
Dept: LAB | Facility: LAB | Age: 74
End: 2024-02-26
Payer: MEDICARE

## 2024-02-26 DIAGNOSIS — F31.30 BIPOLAR AFFECTIVE DISORDER, CURRENT EPISODE DEPRESSED, CURRENT EPISODE SEVERITY UNSPECIFIED (MULTI): ICD-10-CM

## 2024-02-26 LAB
ANION GAP SERPL CALC-SCNC: 12 MMOL/L (ref 10–20)
BUN SERPL-MCNC: 15 MG/DL (ref 6–23)
CALCIUM SERPL-MCNC: 9.4 MG/DL (ref 8.6–10.3)
CHLORIDE SERPL-SCNC: 106 MMOL/L (ref 98–107)
CO2 SERPL-SCNC: 26 MMOL/L (ref 21–32)
CREAT SERPL-MCNC: 0.82 MG/DL (ref 0.5–1.05)
EGFRCR SERPLBLD CKD-EPI 2021: 76 ML/MIN/1.73M*2
GLUCOSE SERPL-MCNC: 78 MG/DL (ref 74–99)
LITHIUM SERPL-SCNC: 0.68 MMOL/L (ref 0.6–1.2)
POTASSIUM SERPL-SCNC: 3.9 MMOL/L (ref 3.5–5.3)
SODIUM SERPL-SCNC: 140 MMOL/L (ref 136–145)

## 2024-02-26 PROCEDURE — 36415 COLL VENOUS BLD VENIPUNCTURE: CPT

## 2024-02-26 PROCEDURE — 80178 ASSAY OF LITHIUM: CPT

## 2024-02-26 PROCEDURE — 80048 BASIC METABOLIC PNL TOTAL CA: CPT

## 2024-03-04 ENCOUNTER — APPOINTMENT (OUTPATIENT)
Dept: BEHAVIORAL HEALTH | Facility: CLINIC | Age: 74
End: 2024-03-04
Payer: MEDICARE

## 2024-03-07 ENCOUNTER — LAB (OUTPATIENT)
Dept: LAB | Facility: LAB | Age: 74
End: 2024-03-07
Payer: MEDICARE

## 2024-03-07 DIAGNOSIS — N39.0 URINARY TRACT INFECTION, SITE NOT SPECIFIED: Primary | ICD-10-CM

## 2024-03-07 LAB
APPEARANCE UR: CLEAR
BACTERIA #/AREA URNS AUTO: ABNORMAL /HPF
BILIRUB UR STRIP.AUTO-MCNC: NEGATIVE MG/DL
COLOR UR: YELLOW
GLUCOSE UR STRIP.AUTO-MCNC: NEGATIVE MG/DL
KETONES UR STRIP.AUTO-MCNC: NEGATIVE MG/DL
LEUKOCYTE ESTERASE UR QL STRIP.AUTO: ABNORMAL
MUCOUS THREADS #/AREA URNS AUTO: ABNORMAL /LPF
NITRITE UR QL STRIP.AUTO: NEGATIVE
PH UR STRIP.AUTO: 7 [PH]
PROT UR STRIP.AUTO-MCNC: NEGATIVE MG/DL
RBC # UR STRIP.AUTO: ABNORMAL /UL
RBC #/AREA URNS AUTO: ABNORMAL /HPF
SP GR UR STRIP.AUTO: 1.01
SQUAMOUS #/AREA URNS AUTO: ABNORMAL /HPF
UROBILINOGEN UR STRIP.AUTO-MCNC: <2 MG/DL
WBC #/AREA URNS AUTO: ABNORMAL /HPF

## 2024-03-07 PROCEDURE — 81001 URINALYSIS AUTO W/SCOPE: CPT

## 2024-03-07 PROCEDURE — 87086 URINE CULTURE/COLONY COUNT: CPT

## 2024-03-08 LAB — BACTERIA UR CULT: NORMAL

## 2024-03-11 ENCOUNTER — APPOINTMENT (OUTPATIENT)
Dept: BEHAVIORAL HEALTH | Facility: CLINIC | Age: 74
End: 2024-03-11
Payer: MEDICARE

## 2024-03-18 ENCOUNTER — OFFICE VISIT (OUTPATIENT)
Dept: BEHAVIORAL HEALTH | Facility: CLINIC | Age: 74
End: 2024-03-18
Payer: MEDICARE

## 2024-03-18 VITALS
DIASTOLIC BLOOD PRESSURE: 87 MMHG | BODY MASS INDEX: 30.22 KG/M2 | SYSTOLIC BLOOD PRESSURE: 141 MMHG | HEIGHT: 66 IN | WEIGHT: 188 LBS | HEART RATE: 66 BPM

## 2024-03-18 DIAGNOSIS — F31.30 BIPOLAR AFFECTIVE DISORDER, CURRENT EPISODE DEPRESSED, CURRENT EPISODE SEVERITY UNSPECIFIED (MULTI): ICD-10-CM

## 2024-03-18 DIAGNOSIS — F31.32 BIPOLAR AFFECTIVE DISORDER, CURRENTLY DEPRESSED, MODERATE (MULTI): ICD-10-CM

## 2024-03-18 PROCEDURE — 1036F TOBACCO NON-USER: CPT | Performed by: PSYCHIATRY & NEUROLOGY

## 2024-03-18 PROCEDURE — 3077F SYST BP >= 140 MM HG: CPT | Performed by: PSYCHIATRY & NEUROLOGY

## 2024-03-18 PROCEDURE — 3079F DIAST BP 80-89 MM HG: CPT | Performed by: PSYCHIATRY & NEUROLOGY

## 2024-03-18 PROCEDURE — 1159F MED LIST DOCD IN RCRD: CPT | Performed by: PSYCHIATRY & NEUROLOGY

## 2024-03-18 PROCEDURE — 99214 OFFICE O/P EST MOD 30 MIN: CPT | Performed by: PSYCHIATRY & NEUROLOGY

## 2024-03-18 PROCEDURE — 1160F RVW MEDS BY RX/DR IN RCRD: CPT | Performed by: PSYCHIATRY & NEUROLOGY

## 2024-03-18 PROCEDURE — 3008F BODY MASS INDEX DOCD: CPT | Performed by: PSYCHIATRY & NEUROLOGY

## 2024-03-18 RX ORDER — ARIPIPRAZOLE 2 MG/1
2 TABLET ORAL DAILY
Qty: 90 TABLET | Refills: 1 | Status: SHIPPED | OUTPATIENT
Start: 2024-03-18

## 2024-03-18 RX ORDER — LITHIUM CARBONATE 150 MG/1
CAPSULE ORAL
Qty: 360 CAPSULE | Refills: 1 | Status: SHIPPED | OUTPATIENT
Start: 2024-03-18

## 2024-03-18 RX ORDER — DULOXETIN HYDROCHLORIDE 30 MG/1
CAPSULE, DELAYED RELEASE ORAL
Qty: 180 CAPSULE | Refills: 1 | Status: SHIPPED | OUTPATIENT
Start: 2024-03-18

## 2024-03-18 RX ORDER — LURASIDONE HYDROCHLORIDE 20 MG/1
TABLET, FILM COATED ORAL
COMMUNITY
Start: 2019-09-16

## 2024-03-18 RX ORDER — NITROFURANTOIN MACROCRYSTALS 50 MG/1
50 CAPSULE ORAL DAILY
COMMUNITY
Start: 2024-02-06

## 2024-03-18 NOTE — PROGRESS NOTES
HPI: Ms. Li is assessed today in person. As usual, she accompanied by her , Tim. She  feels that her mood is “pretty good”. She recently attended a family  and found this to be quite rewarding as she got a chance to meet up with family members she has seen in a long time.   She had blood work done in February. Her serum lithium level is within excellent range. Kidney and renal functioning are both good.    She describes her mood as a “8” on a 1-10 scale (10 is best).     When I saw her last  we reduced her Cymbalta to 30 mg in the morning and 60 mg at bedtime. She did very well with this and does not feel any worse at all.       ROS:  Says her sleep is “off and on” because she has to get up at night to use the bathroom. Sometimes it is hard for her to get back to sleep once she wakes up.  Had a colonoscopy in Feb. There were 5 polyps removed. She needs to have a follow up colonoscopy in 3 years.  She saw her  PCP and GI since she last saw me and I have reviewed these clinical summaries  No falls  Energy level is somewhat less than she would like. “I feel tired a lot”  Appetite is good  Weight is about the same    Appetite: XX Normal/Unchanged  __Increase  _Decrease  SI:  ___ Present  XXAbsent  Sleep: XNormal/Unchanged  __ Increase _Decrease  HI:  ___ Present  XX Absent  Energy: X Normal/Unchanged  __ Increase _ Decrease  Plan:  ___ Present  XX Absent  Patient is: ___ able ___ not able to contract for safety  XXN/A Aggression:   ___ Yes       XX No  Medication Side Effects (SE):  __ None (Psych. Meds.)  ___ Other ___    Explain positives below.     Constitutional :   Eyes            Pos___  Ears/Nose/Mouth/Throat     Pos__________  CV          Pos__________  Respiratory          Pos__________  GI        Pos__________       Pos_   Musculoskeletal        Pos. Is still getting cortisone shots for the  chronic pain in her shoulder.  Gets shots every couple of months and has an appt for May of  this year for another injection.  Skin/Breast         Pos__________  Neurological         Pos__________  Endocrine      Pos__________  Heme/Lymph         Pos__________  Allergic Immunologic   Pos__________      Medications:  Lithium 150 mg. She is taking 4 tablets at bedtime  Duloxetine 30 mg in the morning and 60 mg at bedtime  Abilify 2 mg/day    Social history: She is less active in the winter/stays inside when it is cold    MSE:  General impression: Alert/bright spontaneous. Engaged in the interview.   Speech: clear, coherent. Her speech is normal rate/rhythm  Cognition: grossly intact.   Mood: Good/upbeat.   She describes here mood as an “8” on a 1-10 scale (10 is the best).  Her  agrees with this.  Thought form/content: no psychotic symptoms. She denies hallucinations. Thinking is linear and goal-oriented  Cognition: Grossly intact  Fund of knowledge: excellent  Insight: good  Mrs. ROJAS does not appear to be an acute risk of harm to self or others. She has no thoughts that life is not worth living.      TREATMENT/PLAN: XX Continue Current Medications.  . ___Continue Follow-Up ___Continue Labs ___ Other    PATIENT RESPONSE TO TREATMENT: Excellent response to lithium.   Risks, benefits, Side Effects, Drug-to-Drug Interactions and Alternatives to treatment were discussed in my usual manner: ____ XXYes ____No    Reason for not reducing medication dose(s):       ___XX_N/A __ High risk of patient's deterioration ___ Medication recently reduced      ___Prior Medication Dose Reduction Unsuccessful ____Other     Complexity Issues:   # of diagnoses or management options:  ____Limited   XXMultiple     Problems: (gait, hearing, vision, etc.) effect on treatment and management:   ____Yes      XX No    Risk of complications and/or morbidity or mortality: ____None    ____ Limited   XX Moderate  ____  Severe    Topics discussed:   X_Nature of diagnosis and/or prognosis                                                            X  Medical records reviewed  X_Aspects of aging process and relationship to the current problem               ___Communication with patient's Dr Zhong of possible treatment options/drug drug interaction                         ___Communication with facility staff  X_Risk of non-treatment                                                                                   _X_Communication with family/caregiver  __X_Psychopharmacologic treatment options/possible benefits and risks           ___Referred for psychotherapy  ___Nature of, reasons for and possible benefits from psychotherapy               ___ Forms/reports filled out  ___Family and/or situational stressors                                                               ___Other  X Behavioral and/or environmental changed that might help      Impression:  Type 1 Bipolar disorder with euthymic mood. She is doing well on low-dose Abilify (2 mg/day).  In October of last year we slightly reduced her Cymbalta to 30 mg twice daily and she has done well with this. We discussed next steps. I would like to leave her on her current treatment regimen for now, but if she continues to do well with next follow up visit I will plan to reduce her Abilify to 1 mg/day with a plan to eventually stop this as tolerated. I would like her to get repeat blood work in approximately 4-5 months. I discussed this with Annette and her  and both agree. Total visit today 30 mg/day      Plan for Mrs. Annette Li as March 18, 2024:   Continue duloxetine (Cymbalta) to 30 mg twice daily  Continue lithium 150 mg/Take 4 tablets in the evening/bedtime   Continue Abilify (aripiprazole) 2 mg/day  Get blood work done: basic metabolic panel, CDC with differential, lithium level approximately 2 weeks prior to your next visit with me  If you are doing well on next visit we will try and reduce your Abilify dosage at that time  Follow up with Dr. Sharp in 4-5 months for an in-person  visit

## 2024-04-19 ENCOUNTER — OFFICE VISIT (OUTPATIENT)
Dept: ENDOCRINOLOGY | Facility: CLINIC | Age: 74
End: 2024-04-19
Payer: MEDICARE

## 2024-04-19 VITALS
DIASTOLIC BLOOD PRESSURE: 92 MMHG | HEIGHT: 66 IN | SYSTOLIC BLOOD PRESSURE: 145 MMHG | BODY MASS INDEX: 30.7 KG/M2 | HEART RATE: 70 BPM | WEIGHT: 191 LBS

## 2024-04-19 DIAGNOSIS — E03.9 HYPOTHYROIDISM, UNSPECIFIED TYPE: Primary | ICD-10-CM

## 2024-04-19 DIAGNOSIS — F31.30 BIPOLAR AFFECTIVE DISORDER, CURRENT EPISODE DEPRESSED, CURRENT EPISODE SEVERITY UNSPECIFIED (MULTI): ICD-10-CM

## 2024-04-19 DIAGNOSIS — D35.2 PITUITARY ADENOMA (MULTI): ICD-10-CM

## 2024-04-19 PROCEDURE — 1160F RVW MEDS BY RX/DR IN RCRD: CPT | Performed by: STUDENT IN AN ORGANIZED HEALTH CARE EDUCATION/TRAINING PROGRAM

## 2024-04-19 PROCEDURE — 1036F TOBACCO NON-USER: CPT | Performed by: STUDENT IN AN ORGANIZED HEALTH CARE EDUCATION/TRAINING PROGRAM

## 2024-04-19 PROCEDURE — 3008F BODY MASS INDEX DOCD: CPT | Performed by: STUDENT IN AN ORGANIZED HEALTH CARE EDUCATION/TRAINING PROGRAM

## 2024-04-19 PROCEDURE — 1159F MED LIST DOCD IN RCRD: CPT | Performed by: STUDENT IN AN ORGANIZED HEALTH CARE EDUCATION/TRAINING PROGRAM

## 2024-04-19 PROCEDURE — 3080F DIAST BP >= 90 MM HG: CPT | Performed by: STUDENT IN AN ORGANIZED HEALTH CARE EDUCATION/TRAINING PROGRAM

## 2024-04-19 PROCEDURE — 3077F SYST BP >= 140 MM HG: CPT | Performed by: STUDENT IN AN ORGANIZED HEALTH CARE EDUCATION/TRAINING PROGRAM

## 2024-04-19 PROCEDURE — 1126F AMNT PAIN NOTED NONE PRSNT: CPT | Performed by: STUDENT IN AN ORGANIZED HEALTH CARE EDUCATION/TRAINING PROGRAM

## 2024-04-19 PROCEDURE — 99214 OFFICE O/P EST MOD 30 MIN: CPT | Performed by: STUDENT IN AN ORGANIZED HEALTH CARE EDUCATION/TRAINING PROGRAM

## 2024-04-19 RX ORDER — LEVOTHYROXINE SODIUM 100 UG/1
TABLET ORAL
Qty: 90 TABLET | Refills: 3 | Status: SHIPPED | OUTPATIENT
Start: 2024-05-13

## 2024-04-19 ASSESSMENT — PAIN SCALES - GENERAL: PAINLEVEL: 0-NO PAIN

## 2024-04-19 NOTE — PROGRESS NOTES
73 F PMH: pituitary macroadenoma, hypothyroidism, depression     Coming in today for thyroid and pituitary, previously seen Dr. 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   Current regimen:   Levothyroxine 100mcg daily    On chronic lithium   No ultrasound or FNA in the past     Generally feels well with no specific complaints   Has poor sleep at night which is baseline         Past Medical History:   Diagnosis Date    Atherosclerotic heart disease of native coronary artery without angina pectoris     Coronary artery disease    Body mass index (BMI)30.0-30.9, adult     BMI 30.0-30.9,adult    Chronic sinusitis, unspecified 09/25/2017    Sinobronchitis    Personal history of other diseases of the nervous system and sense organs     History of sleep apnea    Personal history of other endocrine, nutritional and metabolic disease     History of hypothyroidism    Personal history of other endocrine, nutritional and metabolic disease     History of obesity    Unspecified asthma, uncomplicated ([facility]) 02/14/2018    Asthmatic bronchitis      Social History     Socioeconomic History    Marital status:      Spouse name: Not on file    Number of children: Not on file    Years of education: Not on file    Highest education level: Not on file   Occupational History    Not on file   Tobacco Use    Smoking status: Never    Smokeless tobacco: Never   Substance and Sexual Activity    Alcohol use: Yes     Comment: occassional    Drug use: Never    Sexual activity: Not on file   Other Topics Concern    Not on file   Social History Narrative    Not on file     Social Determinants of Health     Financial Resource Strain: Not on file    Food Insecurity: Not on file   Transportation Needs: Not on file   Physical Activity: Not on file   Stress: Not on file   Social Connections: Not on file   Intimate Partner Violence: Not on file   Housing Stability: Not on file      Family History   Problem Relation Name Age of Onset    Heart block Mother      Hypertension Mother      Other (systemic lupus erythematosus) Mother      Other (cardiac disorder) Father      Diabetes Father      Hypertension Father      Other (systemic lupus erthematossus) Father          ROS reviewed and is negative except for pertinent findings noted on HPI    Physical Exam  Constitutional:       Appearance: Normal appearance.   HENT:      Head: Normocephalic.   Eyes:      Extraocular Movements: Extraocular movements intact.   Neck:      Comments: No goiter or nodule palpated  Musculoskeletal:      Comments: Trace lower extremity edema    Skin:     General: Skin is warm.   Neurological:      General: No focal deficit present.      Mental Status: She is alert and oriented to person, place, and time.   Psychiatric:         Mood and Affect: Mood normal.         Behavior: Behavior normal.         labs and imaging reviewed, pertinent findings listed on HPI and Impression      Problem List Items Addressed This Visit       Hypothyroidism - Primary    Relevant Medications    levothyroxine (Synthroid, Levoxyl) 100 mcg tablet (Start on 5/13/2024)    Other Relevant Orders    Renal Function Panel    Bipolar affective disorder, current episode depressed (Multi)     Other Visit Diagnoses       Pituitary adenoma (Multi)        Relevant Orders    Adrenocorticotropic Hormone (ACTH)    FSH & LH    Estradiol    Cortisol    DHEA-Sulfate    Insulin-Like Growth Factor 1    Prolactin    Thyroid Stimulating Hormone    Thyroxine, Free           1) Pituitary microadeoma non funcitonal  No need for repeat imaging unless there is a change in clinical status  Pituitary labs now    2) continue levothryoxine 110mcg  daily    Follow up in one year

## 2024-04-19 NOTE — PATIENT INSTRUCTIONS
Do the labs fasting from midnight, get them done 8-9am in the morning     Continue levothyroxine 100mcg daily     Follow up in 1 year     Hien Messer MD  Divison of Endocrinology   [facility]   Phone: [phone]    option 4, then option 1  Fax: [phone]

## 2024-04-25 ENCOUNTER — LAB (OUTPATIENT)
Dept: LAB | Facility: LAB | Age: 74
End: 2024-04-25
Payer: MEDICARE

## 2024-04-25 DIAGNOSIS — F31.30 BIPOLAR AFFECTIVE DISORDER, CURRENT EPISODE DEPRESSED, CURRENT EPISODE SEVERITY UNSPECIFIED (MULTI): ICD-10-CM

## 2024-04-25 DIAGNOSIS — D35.2 PITUITARY ADENOMA (MULTI): ICD-10-CM

## 2024-04-25 DIAGNOSIS — E03.9 HYPOTHYROIDISM, UNSPECIFIED TYPE: ICD-10-CM

## 2024-04-25 LAB
ALBUMIN SERPL BCP-MCNC: 4 G/DL (ref 3.4–5)
ANION GAP SERPL CALC-SCNC: 9 MMOL/L (ref 10–20)
BASOPHILS # BLD AUTO: 0.04 X10*3/UL (ref 0–0.1)
BASOPHILS NFR BLD AUTO: 1 %
BUN SERPL-MCNC: 15 MG/DL (ref 6–23)
CALCIUM SERPL-MCNC: 9.3 MG/DL (ref 8.6–10.3)
CHLORIDE SERPL-SCNC: 108 MMOL/L (ref 98–107)
CO2 SERPL-SCNC: 28 MMOL/L (ref 21–32)
CORTIS SERPL-MCNC: 23.1 UG/DL (ref 2.5–20)
CREAT SERPL-MCNC: 0.8 MG/DL (ref 0.5–1.05)
DHEA-S SERPL-MCNC: 39 UG/DL (ref 13–130)
EGFRCR SERPLBLD CKD-EPI 2021: 78 ML/MIN/1.73M*2
EOSINOPHIL # BLD AUTO: 0.11 X10*3/UL (ref 0–0.4)
EOSINOPHIL NFR BLD AUTO: 2.7 %
ERYTHROCYTE [DISTWIDTH] IN BLOOD BY AUTOMATED COUNT: 14 % (ref 11.5–14.5)
ESTRADIOL SERPL-MCNC: 20 PG/ML
FSH SERPL-ACNC: 128.6 IU/L
GLUCOSE SERPL-MCNC: 89 MG/DL (ref 74–99)
HCT VFR BLD AUTO: 39.4 % (ref 36–46)
HGB BLD-MCNC: 12.7 G/DL (ref 12–16)
IMM GRANULOCYTES # BLD AUTO: 0.01 X10*3/UL (ref 0–0.5)
IMM GRANULOCYTES NFR BLD AUTO: 0.2 % (ref 0–0.9)
LH SERPL-ACNC: 65.5 IU/L
LITHIUM SERPL-SCNC: 0.72 MMOL/L (ref 0.6–1.2)
LYMPHOCYTES # BLD AUTO: 1.72 X10*3/UL (ref 0.8–3)
LYMPHOCYTES NFR BLD AUTO: 42.2 %
MCH RBC QN AUTO: 33.9 PG (ref 26–34)
MCHC RBC AUTO-ENTMCNC: 32.2 G/DL (ref 32–36)
MCV RBC AUTO: 105 FL (ref 80–100)
MONOCYTES # BLD AUTO: 0.3 X10*3/UL (ref 0.05–0.8)
MONOCYTES NFR BLD AUTO: 7.4 %
NEUTROPHILS # BLD AUTO: 1.9 X10*3/UL (ref 1.6–5.5)
NEUTROPHILS NFR BLD AUTO: 46.5 %
NRBC BLD-RTO: 0 /100 WBCS (ref 0–0)
PHOSPHATE SERPL-MCNC: 4.1 MG/DL (ref 2.5–4.9)
PLATELET # BLD AUTO: 162 X10*3/UL (ref 150–450)
POTASSIUM SERPL-SCNC: 4.4 MMOL/L (ref 3.5–5.3)
PROLACTIN SERPL-MCNC: 14.3 UG/L (ref 3–20)
RBC # BLD AUTO: 3.75 X10*6/UL (ref 4–5.2)
SODIUM SERPL-SCNC: 141 MMOL/L (ref 136–145)
T4 FREE SERPL-MCNC: 0.96 NG/DL (ref 0.61–1.12)
TSH SERPL-ACNC: 2.39 MIU/L (ref 0.44–3.98)
WBC # BLD AUTO: 4.1 X10*3/UL (ref 4.4–11.3)

## 2024-04-25 PROCEDURE — 36415 COLL VENOUS BLD VENIPUNCTURE: CPT

## 2024-04-25 PROCEDURE — 82024 ASSAY OF ACTH: CPT

## 2024-04-25 PROCEDURE — 85025 COMPLETE CBC W/AUTO DIFF WBC: CPT

## 2024-04-25 PROCEDURE — 82627 DEHYDROEPIANDROSTERONE: CPT

## 2024-04-25 PROCEDURE — 82670 ASSAY OF TOTAL ESTRADIOL: CPT

## 2024-04-25 PROCEDURE — 82533 TOTAL CORTISOL: CPT

## 2024-04-25 PROCEDURE — 84305 ASSAY OF SOMATOMEDIN: CPT

## 2024-04-25 PROCEDURE — 83002 ASSAY OF GONADOTROPIN (LH): CPT

## 2024-04-25 PROCEDURE — 80178 ASSAY OF LITHIUM: CPT

## 2024-04-25 PROCEDURE — 84146 ASSAY OF PROLACTIN: CPT

## 2024-04-25 PROCEDURE — 80069 RENAL FUNCTION PANEL: CPT

## 2024-04-25 PROCEDURE — 84439 ASSAY OF FREE THYROXINE: CPT

## 2024-04-25 PROCEDURE — 84443 ASSAY THYROID STIM HORMONE: CPT

## 2024-04-25 PROCEDURE — 83001 ASSAY OF GONADOTROPIN (FSH): CPT

## 2024-04-27 LAB
ACTH PLAS-MCNC: 45 PG/ML (ref 7.2–63.3)
IGF-I SERPL-MCNC: 133 NG/ML (ref 24–222)
IGF-I Z-SCORE SERPL: 0.8

## 2024-06-24 ENCOUNTER — APPOINTMENT (OUTPATIENT)
Dept: PRIMARY CARE | Facility: CLINIC | Age: 74
End: 2024-06-24
Payer: MEDICARE

## 2024-06-24 ENCOUNTER — LAB (OUTPATIENT)
Dept: LAB | Facility: LAB | Age: 74
End: 2024-06-24
Payer: MEDICARE

## 2024-06-24 ENCOUNTER — TELEPHONE (OUTPATIENT)
Dept: OTHER | Age: 74
End: 2024-06-24

## 2024-06-24 DIAGNOSIS — F31.32 BIPOLAR AFFECTIVE DISORDER, CURRENTLY DEPRESSED, MODERATE (MULTI): ICD-10-CM

## 2024-06-24 DIAGNOSIS — N39.0 URINARY TRACT INFECTION, SITE NOT SPECIFIED: Primary | ICD-10-CM

## 2024-06-24 LAB
APPEARANCE UR: CLEAR
BILIRUB UR STRIP.AUTO-MCNC: NEGATIVE MG/DL
COLOR UR: ABNORMAL
GLUCOSE UR STRIP.AUTO-MCNC: NORMAL MG/DL
KETONES UR STRIP.AUTO-MCNC: NEGATIVE MG/DL
LEUKOCYTE ESTERASE UR QL STRIP.AUTO: NEGATIVE
MUCOUS THREADS #/AREA URNS AUTO: NORMAL /LPF
NITRITE UR QL STRIP.AUTO: NEGATIVE
PH UR STRIP.AUTO: 7 [PH]
PROT UR STRIP.AUTO-MCNC: NEGATIVE MG/DL
RBC # UR STRIP.AUTO: ABNORMAL /UL
RBC #/AREA URNS AUTO: NORMAL /HPF
SP GR UR STRIP.AUTO: 1.01
SQUAMOUS #/AREA URNS AUTO: NORMAL /HPF
UROBILINOGEN UR STRIP.AUTO-MCNC: NORMAL MG/DL
WBC #/AREA URNS AUTO: NORMAL /HPF

## 2024-06-24 PROCEDURE — 87086 URINE CULTURE/COLONY COUNT: CPT

## 2024-06-24 PROCEDURE — 81001 URINALYSIS AUTO W/SCOPE: CPT

## 2024-06-24 NOTE — TELEPHONE ENCOUNTER
Pt went to  lab earlier but was turned away due to not having order in chart.  Pt requesting this order ASAP and desk staff to call once it is in available thru  MY CHART

## 2024-06-25 ENCOUNTER — LAB (OUTPATIENT)
Dept: LAB | Facility: LAB | Age: 74
End: 2024-06-25
Payer: MEDICARE

## 2024-06-25 DIAGNOSIS — F31.32 BIPOLAR AFFECTIVE DISORDER, CURRENTLY DEPRESSED, MODERATE (MULTI): ICD-10-CM

## 2024-06-25 LAB
ANION GAP SERPL CALC-SCNC: 11 MMOL/L (ref 10–20)
BASOPHILS # BLD AUTO: 0.04 X10*3/UL (ref 0–0.1)
BASOPHILS NFR BLD AUTO: 0.7 %
BUN SERPL-MCNC: 15 MG/DL (ref 6–23)
CALCIUM SERPL-MCNC: 9.2 MG/DL (ref 8.6–10.3)
CHLORIDE SERPL-SCNC: 106 MMOL/L (ref 98–107)
CO2 SERPL-SCNC: 25 MMOL/L (ref 21–32)
CREAT SERPL-MCNC: 0.77 MG/DL (ref 0.5–1.05)
EGFRCR SERPLBLD CKD-EPI 2021: 82 ML/MIN/1.73M*2
EOSINOPHIL # BLD AUTO: 0.12 X10*3/UL (ref 0–0.4)
EOSINOPHIL NFR BLD AUTO: 2.2 %
ERYTHROCYTE [DISTWIDTH] IN BLOOD BY AUTOMATED COUNT: 13.4 % (ref 11.5–14.5)
GLUCOSE SERPL-MCNC: 86 MG/DL (ref 74–99)
HCT VFR BLD AUTO: 39.2 % (ref 36–46)
HGB BLD-MCNC: 12.2 G/DL (ref 12–16)
IMM GRANULOCYTES # BLD AUTO: 0.03 X10*3/UL (ref 0–0.5)
IMM GRANULOCYTES NFR BLD AUTO: 0.6 % (ref 0–0.9)
LITHIUM SERPL-SCNC: 0.6 MMOL/L (ref 0.6–1.2)
LYMPHOCYTES # BLD AUTO: 1.66 X10*3/UL (ref 0.8–3)
LYMPHOCYTES NFR BLD AUTO: 30.7 %
MCH RBC QN AUTO: 32.6 PG (ref 26–34)
MCHC RBC AUTO-ENTMCNC: 31.1 G/DL (ref 32–36)
MCV RBC AUTO: 105 FL (ref 80–100)
MONOCYTES # BLD AUTO: 0.47 X10*3/UL (ref 0.05–0.8)
MONOCYTES NFR BLD AUTO: 8.7 %
NEUTROPHILS # BLD AUTO: 3.09 X10*3/UL (ref 1.6–5.5)
NEUTROPHILS NFR BLD AUTO: 57.1 %
NRBC BLD-RTO: 0 /100 WBCS (ref 0–0)
PLATELET # BLD AUTO: 182 X10*3/UL (ref 150–450)
POTASSIUM SERPL-SCNC: 4.1 MMOL/L (ref 3.5–5.3)
RBC # BLD AUTO: 3.74 X10*6/UL (ref 4–5.2)
SODIUM SERPL-SCNC: 138 MMOL/L (ref 136–145)
WBC # BLD AUTO: 5.4 X10*3/UL (ref 4.4–11.3)

## 2024-06-25 PROCEDURE — 80178 ASSAY OF LITHIUM: CPT

## 2024-06-25 PROCEDURE — 80048 BASIC METABOLIC PNL TOTAL CA: CPT

## 2024-06-25 PROCEDURE — 85025 COMPLETE CBC W/AUTO DIFF WBC: CPT

## 2024-06-25 PROCEDURE — 36415 COLL VENOUS BLD VENIPUNCTURE: CPT

## 2024-06-26 LAB — BACTERIA UR CULT: NORMAL

## 2024-07-03 ENCOUNTER — APPOINTMENT (OUTPATIENT)
Dept: PRIMARY CARE | Facility: CLINIC | Age: 74
End: 2024-07-03
Payer: MEDICARE

## 2024-07-03 ENCOUNTER — TELEPHONE (OUTPATIENT)
Dept: PRIMARY CARE | Facility: CLINIC | Age: 74
End: 2024-07-03

## 2024-07-03 VITALS
SYSTOLIC BLOOD PRESSURE: 129 MMHG | TEMPERATURE: 97.5 F | WEIGHT: 190 LBS | HEART RATE: 67 BPM | DIASTOLIC BLOOD PRESSURE: 83 MMHG | BODY MASS INDEX: 30.53 KG/M2 | HEIGHT: 66 IN

## 2024-07-03 DIAGNOSIS — E78.5 HYPERLIPIDEMIA, UNSPECIFIED HYPERLIPIDEMIA TYPE: ICD-10-CM

## 2024-07-03 DIAGNOSIS — I10 PRIMARY HYPERTENSION: ICD-10-CM

## 2024-07-03 DIAGNOSIS — I10 PRIMARY HYPERTENSION: Primary | ICD-10-CM

## 2024-07-03 DIAGNOSIS — Z78.9 NEVER SMOKED CIGARETTES: ICD-10-CM

## 2024-07-03 DIAGNOSIS — E66.9 CLASS 1 OBESITY WITH BODY MASS INDEX (BMI) OF 30.0 TO 30.9 IN ADULT, UNSPECIFIED OBESITY TYPE, UNSPECIFIED WHETHER SERIOUS COMORBIDITY PRESENT: ICD-10-CM

## 2024-07-03 PROBLEM — E66.811 CLASS 1 OBESITY WITH BODY MASS INDEX (BMI) OF 30.0 TO 30.9 IN ADULT: Status: ACTIVE | Noted: 2024-07-03

## 2024-07-03 PROCEDURE — 1160F RVW MEDS BY RX/DR IN RCRD: CPT | Performed by: FAMILY MEDICINE

## 2024-07-03 PROCEDURE — 3074F SYST BP LT 130 MM HG: CPT | Performed by: FAMILY MEDICINE

## 2024-07-03 PROCEDURE — 99213 OFFICE O/P EST LOW 20 MIN: CPT | Performed by: FAMILY MEDICINE

## 2024-07-03 PROCEDURE — 3079F DIAST BP 80-89 MM HG: CPT | Performed by: FAMILY MEDICINE

## 2024-07-03 PROCEDURE — 3008F BODY MASS INDEX DOCD: CPT | Performed by: FAMILY MEDICINE

## 2024-07-03 PROCEDURE — 1159F MED LIST DOCD IN RCRD: CPT | Performed by: FAMILY MEDICINE

## 2024-07-03 PROCEDURE — 1036F TOBACCO NON-USER: CPT | Performed by: FAMILY MEDICINE

## 2024-07-03 RX ORDER — LOSARTAN POTASSIUM 100 MG/1
100 TABLET ORAL DAILY
Qty: 90 TABLET | Refills: 1 | Status: SHIPPED | OUTPATIENT
Start: 2024-07-03

## 2024-07-03 RX ORDER — ATORVASTATIN CALCIUM 10 MG/1
5 TABLET, FILM COATED ORAL DAILY
Qty: 45 TABLET | Refills: 1 | Status: SHIPPED | OUTPATIENT
Start: 2024-07-03

## 2024-07-03 ASSESSMENT — ENCOUNTER SYMPTOMS
CARDIOVASCULAR NEGATIVE: 1
GASTROINTESTINAL NEGATIVE: 1
ALLERGIC/IMMUNOLOGIC NEGATIVE: 1
CONSTITUTIONAL NEGATIVE: 1
ENDOCRINE NEGATIVE: 1
HEMATOLOGIC/LYMPHATIC NEGATIVE: 1
EYES NEGATIVE: 1
RESPIRATORY NEGATIVE: 1
NEUROLOGICAL NEGATIVE: 1
MUSCULOSKELETAL NEGATIVE: 1
PSYCHIATRIC NEGATIVE: 1

## 2024-07-03 ASSESSMENT — PATIENT HEALTH QUESTIONNAIRE - PHQ9
SUM OF ALL RESPONSES TO PHQ9 QUESTIONS 1 AND 2: 0
2. FEELING DOWN, DEPRESSED OR HOPELESS: NOT AT ALL
1. LITTLE INTEREST OR PLEASURE IN DOING THINGS: NOT AT ALL

## 2024-07-03 NOTE — PROGRESS NOTES
Subjective     Patient ID: Annette Li is a 73 y.o. female who presents for Follow-up (No other questions or concerns):    Problem List Items Addressed This Visit    None     Past Medical History:   Diagnosis Date    Atherosclerotic heart disease of native coronary artery without angina pectoris     Coronary artery disease    Body mass index (BMI)30.0-30.9, adult     BMI 30.0-30.9,adult    Chronic sinusitis, unspecified 09/25/2017    Sinobronchitis    Personal history of other diseases of the nervous system and sense organs     History of sleep apnea    Personal history of other endocrine, nutritional and metabolic disease     History of hypothyroidism    Personal history of other endocrine, nutritional and metabolic disease     History of obesity    Unspecified asthma, uncomplicated ([facility]) 02/14/2018    Asthmatic bronchitis      Past Surgical History:   Procedure Laterality Date    CHOLECYSTECTOMY  07/31/2014    Cholecystectomy    OTHER SURGICAL HISTORY  07/31/2014    Oophorectomy Unilateral Left Side    OTHER SURGICAL HISTORY  02/01/2022    Gallbladder surgery    OTHER SURGICAL HISTORY  03/25/2022    Complete colonoscopy      Family History   Problem Relation Name Age of Onset    Heart block Mother      Hypertension Mother      Other (systemic lupus erythematosus) Mother      Other (cardiac disorder) Father      Diabetes Father      Hypertension Father      Other (systemic lupus erthematossus) Father        Social History     Socioeconomic History    Marital status:      Spouse name: Not on file    Number of children: Not on file    Years of education: Not on file    Highest education level: Not on file   Occupational History    Not on file   Tobacco Use    Smoking status: Never    Smokeless tobacco: Never   Substance and Sexual Activity    Alcohol use: Yes     Comment: occassional    Drug use: Never    Sexual activity: Not on file   Other Topics Concern    Not on file   Social History Narrative     Not on file     Social Determinants of Health     Financial Resource Strain: Not on file   Food Insecurity: Not on file   Transportation Needs: Not on file   Physical Activity: Not on file   Stress: Not on file   Social Connections: Not on file   Intimate Partner Violence: Not on file   Housing Stability: Not on file      Divalproex and Morphine   Current Outpatient Medications   Medication Sig Dispense Refill    ARIPiprazole (Abilify) 2 mg tablet Take 1 tablet (2 mg) by mouth once daily. 90 tablet 1    aspirin 81 mg EC tablet Take 1 tablet (81 mg) by mouth once daily.      atorvastatin (Lipitor) 10 mg tablet Take 1 tablet (10 mg) by mouth once daily. (Patient taking differently: Take 0.5 tablets (5 mg) by mouth once daily.) 90 tablet 1    cholecalciferol, vitamin D3, 250 mcg (10,000 unit) capsule Take by mouth.      cyanocobalamin (Vitamin B-12) 1,000 mcg tablet Take 1 tablet (1,000 mcg) by mouth once daily.      DULoxetine (Cymbalta) 30 mg DR capsule Take 1 twice daily 180 capsule 1    levothyroxine (Synthroid, Levoxyl) 100 mcg tablet One tab daiy Do not start before May 13, 2024. 90 tablet 3    lithium 150 mg capsule Take 4 at bedtime 360 capsule 1    losartan (Cozaar) 100 mg tablet Take 1 tablet (100 mg) by mouth once daily. 90 tablet 1    Macrobid 100 mg capsule Take 1 capsule (100 mg) by mouth once daily.      lurasidone (Latuda) 20 mg tablet Take by mouth.      nitrofurantoin (Macrodantin) 50 mg capsule Take 1 capsule (50 mg) by mouth once daily.       No current facility-administered medications for this visit.       Immunization History   Administered Date(s) Administered    Flu vaccine (IIV4), preservative free *Check age/dose* 09/13/2021    Flu vaccine, quadrivalent, high-dose, preservative free, age 65y+ (FLUZONE) 10/06/2022, 10/08/2023    Influenza, Unspecified 10/01/2021    Influenza, seasonal, injectable 10/01/2020    Pfizer COVID-19 vaccine, Fall 2023, 12 years and older, (30mcg/0.3mL) 10/30/2023     Pfizer COVID-19 vaccine, bivalent, age 12 years and older (30 mcg/0.3 mL) 10/06/2022    Pfizer Gray Cap SARS-CoV-2 06/24/2022    Pfizer Purple Cap SARS-CoV-2 02/22/2021, 03/13/2021, 04/02/2021, 11/05/2021, 11/17/2021, 04/13/2022    Pneumococcal conjugate vaccine, 13-valent (PREVNAR 13) 09/15/2017    Pneumococcal conjugate vaccine, 20-valent (PREVNAR 20) 05/13/2023    Pneumococcal polysaccharide vaccine, 23-valent, age 2 years and older (PNEUMOVAX 23) 10/15/2018    Tdap vaccine, age 7 year and older (BOOSTRIX, ADACEL) 11/04/2014    Zoster vaccine, recombinant, adult (SHINGRIX) 05/13/2023, 10/13/2023        Review of Systems     Vitals:    07/03/24 1126   BP: 129/83   Pulse: 67   Temp: 36.4 °C (97.5 °F)     Vitals:    07/03/24 1126   Weight: 86.2 kg (190 lb)      Physical Exam     ASSESSMENT/PLAN:         Scribe Attestation  By signing my name below, I, Nely Nguyen LPN, Scribe   attest that this documentation has been prepared under the direction and in the presence of Demarcus Hall MD.

## 2024-07-03 NOTE — PROGRESS NOTES
Bri Bryant is here today for follow-up on hypertension and hyperlipidemia.  She states that she has been feeling well.  She continues on her medications as noted.  She has no new complaints.  She sees multiple consultants including Dr. Majano for cardiology care as well as endocrinology psychiatry and urology.  Mammograms and colonoscopy are up-to-date.    Patient ID: Annette Li is a 73 y.o. female who presents for Follow-up (No other questions or concerns):    Problem List Items Addressed This Visit       Hypertension    Hyperlipidemia    Never smoked cigarettes    Class 1 obesity with body mass index (BMI) of 30.0 to 30.9 in adult      Past Medical History:   Diagnosis Date    Atherosclerotic heart disease of native coronary artery without angina pectoris     Coronary artery disease    Body mass index (BMI)30.0-30.9, adult     BMI 30.0-30.9,adult    Chronic sinusitis, unspecified 09/25/2017    Sinobronchitis    Personal history of other diseases of the nervous system and sense organs     History of sleep apnea    Personal history of other endocrine, nutritional and metabolic disease     History of hypothyroidism    Personal history of other endocrine, nutritional and metabolic disease     History of obesity    Unspecified asthma, uncomplicated ([facility]) 02/14/2018    Asthmatic bronchitis      Past Surgical History:   Procedure Laterality Date    CHOLECYSTECTOMY  07/31/2014    Cholecystectomy    OTHER SURGICAL HISTORY  07/31/2014    Oophorectomy Unilateral Left Side    OTHER SURGICAL HISTORY  02/01/2022    Gallbladder surgery    OTHER SURGICAL HISTORY  03/25/2022    Complete colonoscopy      Family History   Problem Relation Name Age of Onset    Heart block Mother      Hypertension Mother      Other (systemic lupus erythematosus) Mother      Other (cardiac disorder) Father      Diabetes Father      Hypertension Father      Other (systemic lupus erthematossus) Father        Social History      Socioeconomic History    Marital status:      Spouse name: Not on file    Number of children: Not on file    Years of education: Not on file    Highest education level: Not on file   Occupational History    Not on file   Tobacco Use    Smoking status: Never    Smokeless tobacco: Never   Substance and Sexual Activity    Alcohol use: Yes     Comment: occassional    Drug use: Never    Sexual activity: Not on file   Other Topics Concern    Not on file   Social History Narrative    Not on file     Social Determinants of Health     Financial Resource Strain: Not on file   Food Insecurity: Not on file   Transportation Needs: Not on file   Physical Activity: Not on file   Stress: Not on file   Social Connections: Not on file   Intimate Partner Violence: Not on file   Housing Stability: Not on file      Divalproex and Morphine   Current Outpatient Medications   Medication Sig Dispense Refill    ARIPiprazole (Abilify) 2 mg tablet Take 1 tablet (2 mg) by mouth once daily. 90 tablet 1    aspirin 81 mg EC tablet Take 1 tablet (81 mg) by mouth once daily.      atorvastatin (Lipitor) 10 mg tablet Take 1 tablet (10 mg) by mouth once daily. (Patient taking differently: Take 0.5 tablets (5 mg) by mouth once daily.) 90 tablet 1    cholecalciferol, vitamin D3, 250 mcg (10,000 unit) capsule Take by mouth.      cyanocobalamin (Vitamin B-12) 1,000 mcg tablet Take 1 tablet (1,000 mcg) by mouth once daily.      DULoxetine (Cymbalta) 30 mg DR capsule Take 1 twice daily 180 capsule 1    levothyroxine (Synthroid, Levoxyl) 100 mcg tablet One tab daiy Do not start before May 13, 2024. 90 tablet 3    lithium 150 mg capsule Take 4 at bedtime 360 capsule 1    losartan (Cozaar) 100 mg tablet Take 1 tablet (100 mg) by mouth once daily. 90 tablet 1    Macrobid 100 mg capsule Take 1 capsule (100 mg) by mouth once daily.      lurasidone (Latuda) 20 mg tablet Take by mouth.      nitrofurantoin (Macrodantin) 50 mg capsule Take 1 capsule (50 mg)  by mouth once daily.       No current facility-administered medications for this visit.       Immunization History   Administered Date(s) Administered    Flu vaccine (IIV4), preservative free *Check age/dose* 09/13/2021    Flu vaccine, quadrivalent, high-dose, preservative free, age 65y+ (FLUZONE) 10/06/2022, 10/08/2023    Influenza, Unspecified 10/01/2021    Influenza, seasonal, injectable 10/01/2020    Pfizer COVID-19 vaccine, Fall 2023, 12 years and older, (30mcg/0.3mL) 10/30/2023    Pfizer COVID-19 vaccine, bivalent, age 12 years and older (30 mcg/0.3 mL) 10/06/2022    Pfizer Gray Cap SARS-CoV-2 06/24/2022    Pfizer Purple Cap SARS-CoV-2 02/22/2021, 03/13/2021, 04/02/2021, 11/05/2021, 11/17/2021, 04/13/2022    Pneumococcal conjugate vaccine, 13-valent (PREVNAR 13) 09/15/2017    Pneumococcal conjugate vaccine, 20-valent (PREVNAR 20) 05/13/2023    Pneumococcal polysaccharide vaccine, 23-valent, age 2 years and older (PNEUMOVAX 23) 10/15/2018    Tdap vaccine, age 7 year and older (BOOSTRIX, ADACEL) 11/04/2014    Zoster vaccine, recombinant, adult (SHINGRIX) 05/13/2023, 10/13/2023        Review of Systems   Constitutional: Negative.    HENT: Negative.     Eyes: Negative.    Respiratory: Negative.     Cardiovascular: Negative.    Gastrointestinal: Negative.    Endocrine: Negative.    Genitourinary: Negative.    Musculoskeletal: Negative.    Skin: Negative.    Allergic/Immunologic: Negative.    Neurological: Negative.    Hematological: Negative.    Psychiatric/Behavioral: Negative.     All other systems reviewed and are negative.       Vitals:    07/03/24 1126   BP: 129/83   Pulse: 67   Temp: 36.4 °C (97.5 °F)     Vitals:    07/03/24 1126   Weight: 86.2 kg (190 lb)      Physical Exam  Constitutional:       General: She is not in acute distress.     Appearance: Normal appearance.   Cardiovascular:      Rate and Rhythm: Normal rate and regular rhythm.      Pulses: Normal pulses.      Heart sounds: Normal heart sounds.  No murmur heard.     No friction rub. No gallop.   Pulmonary:      Effort: Pulmonary effort is normal. No respiratory distress.      Breath sounds: Normal breath sounds. No wheezing or rales.   Neurological:      General: No focal deficit present.      Mental Status: She is alert and oriented to person, place, and time. Mental status is at baseline.   Psychiatric:         Mood and Affect: Mood normal.         Thought Content: Thought content normal.          ASSESSMENT/PLAN: Hypertension stable.  Continue losartan daily.    Hyperlipidemia.  Check lipid profile in October 2024.  Continue atorvastatin daily    Hypothyroid followed by endocrinology    Bipolar disorder stable and followed by psychiatry    History of pituitary adenoma followed by endocrinology    Mammograms and colonoscopy are up-to-date.  Recommended RSV vaccination  Continue medications as noted  Follow-up 6 months and call as needed

## 2024-07-03 NOTE — TELEPHONE ENCOUNTER
Patient was in today and was mistaken, she does not see her endocrinologist until next year.  She is asking if you want any additional labs done prior to her Dec AWV.

## 2024-07-15 ENCOUNTER — APPOINTMENT (OUTPATIENT)
Dept: BEHAVIORAL HEALTH | Facility: CLINIC | Age: 74
End: 2024-07-15
Payer: MEDICARE

## 2024-07-15 VITALS
BODY MASS INDEX: 30.42 KG/M2 | SYSTOLIC BLOOD PRESSURE: 132 MMHG | TEMPERATURE: 97.3 F | RESPIRATION RATE: 16 BRPM | HEIGHT: 66 IN | DIASTOLIC BLOOD PRESSURE: 79 MMHG | WEIGHT: 189.3 LBS | HEART RATE: 61 BPM

## 2024-07-15 DIAGNOSIS — F31.30 BIPOLAR AFFECTIVE DISORDER, CURRENT EPISODE DEPRESSED, CURRENT EPISODE SEVERITY UNSPECIFIED (MULTI): ICD-10-CM

## 2024-07-15 PROCEDURE — 1159F MED LIST DOCD IN RCRD: CPT | Performed by: PSYCHIATRY & NEUROLOGY

## 2024-07-15 PROCEDURE — 1160F RVW MEDS BY RX/DR IN RCRD: CPT | Performed by: PSYCHIATRY & NEUROLOGY

## 2024-07-15 PROCEDURE — 1036F TOBACCO NON-USER: CPT | Performed by: PSYCHIATRY & NEUROLOGY

## 2024-07-15 PROCEDURE — 99214 OFFICE O/P EST MOD 30 MIN: CPT | Performed by: PSYCHIATRY & NEUROLOGY

## 2024-07-15 PROCEDURE — 3075F SYST BP GE 130 - 139MM HG: CPT | Performed by: PSYCHIATRY & NEUROLOGY

## 2024-07-15 PROCEDURE — 1126F AMNT PAIN NOTED NONE PRSNT: CPT | Performed by: PSYCHIATRY & NEUROLOGY

## 2024-07-15 PROCEDURE — 3078F DIAST BP <80 MM HG: CPT | Performed by: PSYCHIATRY & NEUROLOGY

## 2024-07-15 PROCEDURE — 3008F BODY MASS INDEX DOCD: CPT | Performed by: PSYCHIATRY & NEUROLOGY

## 2024-07-15 RX ORDER — ARIPIPRAZOLE 2 MG/1
TABLET ORAL
Qty: 45 TABLET | Refills: 1 | Status: SHIPPED | OUTPATIENT
Start: 2024-07-15

## 2024-07-15 RX ORDER — LITHIUM CARBONATE 150 MG/1
CAPSULE ORAL
Qty: 360 CAPSULE | Refills: 1 | Status: SHIPPED | OUTPATIENT
Start: 2024-07-15

## 2024-07-15 RX ORDER — DULOXETIN HYDROCHLORIDE 30 MG/1
CAPSULE, DELAYED RELEASE ORAL
Qty: 180 CAPSULE | Refills: 1 | Status: SHIPPED | OUTPATIENT
Start: 2024-07-15

## 2024-07-15 ASSESSMENT — PAIN SCALES - GENERAL: PAINLEVEL: 0-NO PAIN

## 2024-07-15 NOTE — PROGRESS NOTES
HPI: Ms. Li is assessed today in person. She accompanied by her , Tim. She  feels that her mood is good. She was last seen by me in March of 2024 and we discussed the possibility of reducing or stopping her Abilify if she continues to do well.   She had blood work ordered by me in late June. Her serum lithium level is within excellent range )(0.6). Kidney and renal functioning are both good. GFR=  82    She describes her mood as a “about 8 or 8 and 1/2” on a 1-10 scale (10 is best). Her  concurs.   Since she saw me she saw her endocrinologist. I have reviewed this clinical summary      ROS:  She saw her PCP since she saw me last.  I have reviewed this clinical summary  She has been maintained on macrodantin and feels this has been helpful  Sleeping is variable  No falls  Energy level is ok  Appetite is good  Weight has been stable although she wants to lose weight    Appetite: XX Normal/Unchanged  __Increase  _Decrease  SI:  ___ Present  XXAbsent  Sleep: XNormal/Unchanged  __ Increase _Decrease  HI:  ___ Present  XX Absent  Energy: X Normal/Unchanged  __ Increase _ Decrease  Plan:  ___ Present  XX Absent  Patient is: ___ able ___ not able to contract for safety  XXN/A Aggression:   ___ Yes       XX No  Medication Side Effects (SE):  __ None (Psych. Meds.)  ___ Other ___    Explain positives below.     Constitutional :   Eyes            Pos___  Ears/Nose/Mouth/Throat     Pos__________  CV          Pos__________  Respiratory          Pos__________  GI        Pos__________       Pos_   Musculoskeletal        Pos. Still getting cortisone shots for the  chronic pain in her shoulder.  Gets shots every couple of months  Skin/Breast         Pos__________  Neurological         Pos__________  Endocrine      Pos__________  Heme/Lymph         Pos__________  Allergic Immunologic   Pos__________      Vital signs today:    7/15/2024 12:48 PM    /79   Patient Position Sitting   Pulse 61   Resp 16   Temp  "36.3 °C (97.3 °F)   Weight 85.9 kg (189 lb 4.8 oz)   Height 1.676 m (5' 6\")   Pain Score 0-No pain       Medications:  Lithium 150 mg. She is taking 4 tablets at bedtime  Duloxetine 30 mg twice daily  Abilify 2 mg/day    Social history: She is enjoying the summer. Plans to visit her daughter over the weekend    MSE:  General impression: Alert/bright spontaneous. Engaged in the interview.   Speech: clear, coherent. Her speech is normal rate/rhythm  Cognition: grossly intact.   Mood: Good/upbeat.   Future oriented.  Thought form/content: no psychotic symptoms. She denies hallucinations. Thinking is linear and goal-oriented  Cognition: Grossly intact  Fund of knowledge: excellent  Insight: good  Mrs. ROJAS does not appear to be an acute risk of harm to self or others. She has no thoughts that life is not worth living.      TREATMENT/PLAN: XX Continue Current Medications.  . ___Continue Follow-Up ___Continue Labs ___ Other    PATIENT RESPONSE TO TREATMENT: Excellent response to lithium.   Risks, benefits, Side Effects, Drug-to-Drug Interactions and Alternatives to treatment were discussed in my usual manner: ____ XXYes ____No    Reason for not reducing medication dose(s):       ___XX_N/A __ High risk of patient's deterioration ___ Medication recently reduced      ___Prior Medication Dose Reduction Unsuccessful ____Other     Complexity Issues:   # of diagnoses or management options:  ____Limited   XXMultiple     Problems: (gait, hearing, vision, etc.) effect on treatment and management:   ____Yes      XX No    Risk of complications and/or morbidity or mortality: ____None    ____ Limited   XX Moderate  ____  Severe    Topics discussed:   X_Nature of diagnosis and/or prognosis                                                           X  Medical records reviewed  X_Aspects of aging process and relationship to the current problem               ___Communication with patient's Dr Zhong of possible treatment options/drug drug " interaction                         ___Communication with facility staff  X_Risk of non-treatment                                                                                   _X_Communication with family/caregiver  __X_Psychopharmacologic treatment options/possible benefits and risks           ___Referred for psychotherapy  ___Nature of, reasons for and possible benefits from psychotherapy               ___ Forms/reports filled out  ___Family and/or situational stressors                                                               ___Other  X Behavioral and/or environmental changed that might help      Impression:  Type 1 Bipolar disorder with euthymic mood. She is doing well on low-dose Abilify (2 mg/day).  In March we reduced her Cymbalta to 30 mg twice daily and she has done well with this.  I believe it is reasonable to try and further taper the Abilify to 2 mg three days/week.  I discussed this with Annette and her  and both agree. Total visit today 30 mg/day      Plan for Mrs. Annette Li as July 15, 2023:   Continue duloxetine (Cymbalta) 30 mg twice daily  Continue lithium 150 mg/Take 4 tablets in the evening/bedtime   Reduce you Abilify (aripiprazole) 2 mg to 3 x/week  Get blood work done: basic metabolic panel, CDC with differential, lithium level approximately 2-3 weeks prior to your next visit with me  Follow up with Dr. Sharp in 4-5 months for an in-person visit

## 2024-10-10 ENCOUNTER — LAB (OUTPATIENT)
Dept: LAB | Facility: LAB | Age: 74
End: 2024-10-10
Payer: MEDICARE

## 2024-10-10 DIAGNOSIS — E78.5 HYPERLIPIDEMIA, UNSPECIFIED HYPERLIPIDEMIA TYPE: ICD-10-CM

## 2024-10-10 DIAGNOSIS — I10 PRIMARY HYPERTENSION: ICD-10-CM

## 2024-10-10 DIAGNOSIS — F31.30 BIPOLAR AFFECTIVE DISORDER, CURRENT EPISODE DEPRESSED, CURRENT EPISODE SEVERITY UNSPECIFIED (MULTI): ICD-10-CM

## 2024-10-10 LAB
ALBUMIN SERPL BCP-MCNC: 4.1 G/DL (ref 3.4–5)
ALP SERPL-CCNC: 61 U/L (ref 33–136)
ALT SERPL W P-5'-P-CCNC: 19 U/L (ref 7–45)
ANION GAP SERPL CALC-SCNC: 7 MMOL/L (ref 10–20)
AST SERPL W P-5'-P-CCNC: 18 U/L (ref 9–39)
BASOPHILS # BLD AUTO: 0.03 X10*3/UL (ref 0–0.1)
BASOPHILS NFR BLD AUTO: 0.7 %
BILIRUB SERPL-MCNC: 1.2 MG/DL (ref 0–1.2)
BUN SERPL-MCNC: 15 MG/DL (ref 6–23)
CALCIUM SERPL-MCNC: 9.5 MG/DL (ref 8.6–10.3)
CHLORIDE SERPL-SCNC: 109 MMOL/L (ref 98–107)
CHOLEST SERPL-MCNC: 167 MG/DL (ref 0–199)
CHOLESTEROL/HDL RATIO: 2.1
CO2 SERPL-SCNC: 29 MMOL/L (ref 21–32)
CREAT SERPL-MCNC: 0.84 MG/DL (ref 0.5–1.05)
EGFRCR SERPLBLD CKD-EPI 2021: 74 ML/MIN/1.73M*2
EOSINOPHIL # BLD AUTO: 0.09 X10*3/UL (ref 0–0.4)
EOSINOPHIL NFR BLD AUTO: 2.1 %
ERYTHROCYTE [DISTWIDTH] IN BLOOD BY AUTOMATED COUNT: 14.6 % (ref 11.5–14.5)
GLUCOSE SERPL-MCNC: 92 MG/DL (ref 74–99)
HCT VFR BLD AUTO: 39.7 % (ref 36–46)
HDLC SERPL-MCNC: 79.6 MG/DL
HGB BLD-MCNC: 12.7 G/DL (ref 12–16)
IMM GRANULOCYTES # BLD AUTO: 0.01 X10*3/UL (ref 0–0.5)
IMM GRANULOCYTES NFR BLD AUTO: 0.2 % (ref 0–0.9)
LDLC SERPL CALC-MCNC: 75 MG/DL
LITHIUM SERPL-SCNC: 0.7 MMOL/L (ref 0.6–1.2)
LYMPHOCYTES # BLD AUTO: 1.56 X10*3/UL (ref 0.8–3)
LYMPHOCYTES NFR BLD AUTO: 36.4 %
MCH RBC QN AUTO: 32.7 PG (ref 26–34)
MCHC RBC AUTO-ENTMCNC: 32 G/DL (ref 32–36)
MCV RBC AUTO: 102 FL (ref 80–100)
MONOCYTES # BLD AUTO: 0.33 X10*3/UL (ref 0.05–0.8)
MONOCYTES NFR BLD AUTO: 7.7 %
NEUTROPHILS # BLD AUTO: 2.27 X10*3/UL (ref 1.6–5.5)
NEUTROPHILS NFR BLD AUTO: 52.9 %
NON HDL CHOLESTEROL: 87 MG/DL (ref 0–149)
NRBC BLD-RTO: 0 /100 WBCS (ref 0–0)
PLATELET # BLD AUTO: 177 X10*3/UL (ref 150–450)
POTASSIUM SERPL-SCNC: 4.4 MMOL/L (ref 3.5–5.3)
PROT SERPL-MCNC: 6.4 G/DL (ref 6.4–8.2)
RBC # BLD AUTO: 3.88 X10*6/UL (ref 4–5.2)
SODIUM SERPL-SCNC: 141 MMOL/L (ref 136–145)
TRIGL SERPL-MCNC: 61 MG/DL (ref 0–149)
VLDL: 12 MG/DL (ref 0–40)
WBC # BLD AUTO: 4.3 X10*3/UL (ref 4.4–11.3)

## 2024-10-10 PROCEDURE — 36415 COLL VENOUS BLD VENIPUNCTURE: CPT

## 2024-10-10 PROCEDURE — 85025 COMPLETE CBC W/AUTO DIFF WBC: CPT

## 2024-10-10 PROCEDURE — 80053 COMPREHEN METABOLIC PANEL: CPT

## 2024-10-10 PROCEDURE — 80061 LIPID PANEL: CPT

## 2024-10-10 PROCEDURE — 80178 ASSAY OF LITHIUM: CPT

## 2024-10-14 ENCOUNTER — TELEPHONE (OUTPATIENT)
Dept: PRIMARY CARE | Facility: CLINIC | Age: 74
End: 2024-10-14
Payer: MEDICARE

## 2024-10-14 ENCOUNTER — TELEPHONE (OUTPATIENT)
Dept: BEHAVIORAL HEALTH | Facility: CLINIC | Age: 74
End: 2024-10-14
Payer: MEDICARE

## 2024-10-14 NOTE — TELEPHONE ENCOUNTER
----- Message from eDmarcus Hall sent at 10/11/2024 11:14 AM EDT -----  Labs normal.  Chol and sugar normal

## 2024-11-18 ENCOUNTER — APPOINTMENT (OUTPATIENT)
Dept: BEHAVIORAL HEALTH | Facility: CLINIC | Age: 74
End: 2024-11-18
Payer: MEDICARE

## 2024-11-18 VITALS
SYSTOLIC BLOOD PRESSURE: 157 MMHG | RESPIRATION RATE: 16 BRPM | TEMPERATURE: 97.7 F | HEART RATE: 67 BPM | HEIGHT: 66 IN | DIASTOLIC BLOOD PRESSURE: 86 MMHG | WEIGHT: 183.8 LBS | BODY MASS INDEX: 29.54 KG/M2

## 2024-11-18 DIAGNOSIS — F31.30 BIPOLAR AFFECTIVE DISORDER, CURRENT EPISODE DEPRESSED, CURRENT EPISODE SEVERITY UNSPECIFIED (MULTI): ICD-10-CM

## 2024-11-18 PROCEDURE — 1036F TOBACCO NON-USER: CPT | Performed by: PSYCHIATRY & NEUROLOGY

## 2024-11-18 PROCEDURE — 1159F MED LIST DOCD IN RCRD: CPT | Performed by: PSYCHIATRY & NEUROLOGY

## 2024-11-18 PROCEDURE — 3079F DIAST BP 80-89 MM HG: CPT | Performed by: PSYCHIATRY & NEUROLOGY

## 2024-11-18 PROCEDURE — 1160F RVW MEDS BY RX/DR IN RCRD: CPT | Performed by: PSYCHIATRY & NEUROLOGY

## 2024-11-18 PROCEDURE — 3008F BODY MASS INDEX DOCD: CPT | Performed by: PSYCHIATRY & NEUROLOGY

## 2024-11-18 PROCEDURE — 3077F SYST BP >= 140 MM HG: CPT | Performed by: PSYCHIATRY & NEUROLOGY

## 2024-11-18 PROCEDURE — 99214 OFFICE O/P EST MOD 30 MIN: CPT | Performed by: PSYCHIATRY & NEUROLOGY

## 2024-11-18 PROCEDURE — 1126F AMNT PAIN NOTED NONE PRSNT: CPT | Performed by: PSYCHIATRY & NEUROLOGY

## 2024-11-18 RX ORDER — DULOXETIN HYDROCHLORIDE 30 MG/1
CAPSULE, DELAYED RELEASE ORAL
Qty: 180 CAPSULE | Refills: 1 | Status: SHIPPED | OUTPATIENT
Start: 2024-11-18

## 2024-11-18 RX ORDER — LITHIUM CARBONATE 150 MG/1
CAPSULE ORAL
Qty: 360 CAPSULE | Refills: 1 | Status: SHIPPED | OUTPATIENT
Start: 2024-11-18

## 2024-11-18 ASSESSMENT — PAIN SCALES - GENERAL: PAINLEVEL_OUTOF10: 0-NO PAIN

## 2024-11-18 NOTE — PROGRESS NOTES
HPI: Ms. Li is assessed today in person. She accompanied by her , Tim. She  feels that her mood is good. She was last seen by me in July of 2024 and we reduced her Abilify to 3 times/week. Her  feels she is doing well and that her mood has been good.  She had blood work ordered by me in October. Her serum lithium level is within excellent range )(0.7). Kidney and renal functioning are both good. GFR=  74    She describes her mood as an 8 or 9 on a 1-10 scale (10 is best). Her  concurs.       ROS:  She had a skin cancer removed a week ago. Had an excisional biopsy and has follow up with dermatology later this week.  Sleeping is “so so” but overall “not bad”  She has good appetite  No falls  Energy level is fair  Says she has been worried about her skin biopsy  I reviewed her ophthalmology and gynecology recent clinical visit summaries    Appetite: XX Normal/Unchanged  __Increase  _Decrease  SI:  ___ Present  XXAbsent  Sleep: XNormal/Unchanged  __ Increase _Decrease  HI:  ___ Present  XX Absent  Energy: X Normal/Unchanged  __ Increase _ Decrease  Plan:  ___ Present  XX Absent  Patient is: ___ able ___ not able to contract for safety  XXN/A Aggression:   ___ Yes       XX No  Medication Side Effects (SE):  __ None (Psych. Meds.)  ___ Other ___    Explain positives below.     Constitutional :   Eyes            Pos___  Ears/Nose/Mouth/Throat     Pos__________  CV          Pos__________  Respiratory          Pos__________  GI        Pos__________       Pos_   Musculoskeletal        Pos. Still getting cortisone shots for the  chronic pain in her shoulder.  Gets shots every couple of months. Last injection was a week ago  Skin/Breast         Pos__________  Neurological         Pos__________  Endocrine      Pos__________  Heme/Lymph         Pos__________  Allergic Immunologic   Pos__________      Vital signs today:  /86   Patient Position Sitting   Heart Rate 67   Temp 36.5 °C (97.7 °F)   Temp  "Source Temporal   Weight 83.4 kg (183 lb 12.8 oz)   Height 1.676 m (5' 6\")   Resp 16   Pain Score 0-No pain         Medications:  Lithium 150 mg. She is taking 4 tablets at bedtime  Duloxetine 30 mg twice daily  Abilify 2 mg three times/week    Social history: She is a little stressed out over her recent biopsy/skin cancer diagnosis    MSE:  General impression: Alert/bright spontaneous. Engaged in the interview.   Speech: clear, coherent. Her speech is normal rate/rhythm  Cognition: grossly intact.   Mood: Good/upbeat.   Future oriented.  Thought form/content: no psychotic symptoms. She denies hallucinations. Thinking is linear and goal-oriented  Cognition: Grossly intact  Fund of knowledge: excellent  Insight: good  Mrs. ROJAS does not appear to be an acute risk of harm to self or others. She has no thoughts that life is not worth living.      TREATMENT/PLAN: XX Continue Current Medications.  . ___Continue Follow-Up ___Continue Labs ___ Other    PATIENT RESPONSE TO TREATMENT: Excellent response to lithium. She has tolerated a reduction in Abilify without any problems  Risks, benefits, Side Effects, Drug-to-Drug Interactions and Alternatives to treatment were discussed in my usual manner: ____ XXYes ____No    Reason for not reducing medication dose(s):       ___XX_N/A __ High risk of patient's deterioration ___ Medication recently reduced      ___Prior Medication Dose Reduction Unsuccessful ____Other     Complexity Issues:   # of diagnoses or management options:  ____Limited   XXMultiple     Problems: (gait, hearing, vision, etc.) effect on treatment and management:   ____Yes      XX No    Risk of complications and/or morbidity or mortality: ____None    ____ Limited   XX Moderate  ____  Severe    Topics discussed:   X_Nature of diagnosis and/or prognosis                                                           X  Medical records reviewed  X_Aspects of aging process and relationship to the current problem               " ___Communication with patient's Dr Zhong of possible treatment options/drug drug interaction                         ___Communication with facility staff  X_Risk of non-treatment                                                                                   _X_Communication with family/caregiver  __X_Psychopharmacologic treatment options/possible benefits and risks           ___Referred for psychotherapy  ___Nature of, reasons for and possible benefits from psychotherapy               ___ Forms/reports filled out  ___Family and/or situational stressors                                                               ___Other  X Behavioral and/or environmental changed that might help      Impression:  Type 1 Bipolar disorder with euthymic mood. She has done well with a reduction in Abilify to 3 days/week and I believe it is reasonable to stop this.  I discussed this with Annette and her  and both agree. Total visit today 30 mg/day      Plan for Mrs. Annette Li as November 18, 2024:   Continue duloxetine (Cymbalta) 30 mg twice daily  Continue lithium 150 mg/Take 4 tablets in the evening/bedtime   Stop your Abilify (aripiprazole)   Get blood work done: basic metabolic panel, lithium level approximately 2-3 weeks prior to your next visit with me  Follow up with Dr. Sharp in 6 months for an in-person visit

## 2024-12-02 ENCOUNTER — APPOINTMENT (OUTPATIENT)
Dept: PRIMARY CARE | Facility: CLINIC | Age: 74
End: 2024-12-02
Payer: MEDICARE

## 2024-12-02 VITALS
HEART RATE: 58 BPM | HEIGHT: 66 IN | DIASTOLIC BLOOD PRESSURE: 85 MMHG | SYSTOLIC BLOOD PRESSURE: 150 MMHG | BODY MASS INDEX: 30.08 KG/M2 | WEIGHT: 187.2 LBS | TEMPERATURE: 97.3 F

## 2024-12-02 DIAGNOSIS — I10 PRIMARY HYPERTENSION: ICD-10-CM

## 2024-12-02 DIAGNOSIS — E55.9 VITAMIN D INSUFFICIENCY: ICD-10-CM

## 2024-12-02 DIAGNOSIS — E78.5 HYPERLIPIDEMIA, UNSPECIFIED HYPERLIPIDEMIA TYPE: ICD-10-CM

## 2024-12-02 DIAGNOSIS — Z00.00 MEDICARE ANNUAL WELLNESS VISIT, SUBSEQUENT: ICD-10-CM

## 2024-12-02 DIAGNOSIS — Z78.9 NEVER SMOKED CIGARETTES: ICD-10-CM

## 2024-12-02 DIAGNOSIS — E03.9 ACQUIRED HYPOTHYROIDISM: ICD-10-CM

## 2024-12-02 DIAGNOSIS — E66.811 CLASS 1 OBESITY WITH BODY MASS INDEX (BMI) OF 30.0 TO 30.9 IN ADULT, UNSPECIFIED OBESITY TYPE, UNSPECIFIED WHETHER SERIOUS COMORBIDITY PRESENT: ICD-10-CM

## 2024-12-02 PROCEDURE — 3079F DIAST BP 80-89 MM HG: CPT | Performed by: FAMILY MEDICINE

## 2024-12-02 PROCEDURE — 1036F TOBACCO NON-USER: CPT | Performed by: FAMILY MEDICINE

## 2024-12-02 PROCEDURE — 1123F ACP DISCUSS/DSCN MKR DOCD: CPT | Performed by: FAMILY MEDICINE

## 2024-12-02 PROCEDURE — 3077F SYST BP >= 140 MM HG: CPT | Performed by: FAMILY MEDICINE

## 2024-12-02 PROCEDURE — 1170F FXNL STATUS ASSESSED: CPT | Performed by: FAMILY MEDICINE

## 2024-12-02 PROCEDURE — 1159F MED LIST DOCD IN RCRD: CPT | Performed by: FAMILY MEDICINE

## 2024-12-02 PROCEDURE — 1158F ADVNC CARE PLAN TLK DOCD: CPT | Performed by: FAMILY MEDICINE

## 2024-12-02 PROCEDURE — 3008F BODY MASS INDEX DOCD: CPT | Performed by: FAMILY MEDICINE

## 2024-12-02 PROCEDURE — 1160F RVW MEDS BY RX/DR IN RCRD: CPT | Performed by: FAMILY MEDICINE

## 2024-12-02 PROCEDURE — G0439 PPPS, SUBSEQ VISIT: HCPCS | Performed by: FAMILY MEDICINE

## 2024-12-02 RX ORDER — ATORVASTATIN CALCIUM 10 MG/1
5 TABLET, FILM COATED ORAL DAILY
Qty: 45 TABLET | Refills: 3 | Status: SHIPPED | OUTPATIENT
Start: 2024-12-02

## 2024-12-02 RX ORDER — LOSARTAN POTASSIUM 100 MG/1
100 TABLET ORAL DAILY
Qty: 90 TABLET | Refills: 3 | Status: SHIPPED | OUTPATIENT
Start: 2024-12-02

## 2024-12-02 RX ORDER — VIT C/E/ZN/COPPR/LUTEIN/ZEAXAN 250MG-90MG
CAPSULE ORAL
Qty: 24 CAPSULE | Refills: 3 | Status: SHIPPED | OUTPATIENT
Start: 2024-12-02

## 2024-12-02 ASSESSMENT — ENCOUNTER SYMPTOMS
MUSCULOSKELETAL NEGATIVE: 1
ALLERGIC/IMMUNOLOGIC NEGATIVE: 1
HEMATOLOGIC/LYMPHATIC NEGATIVE: 1
RESPIRATORY NEGATIVE: 1
ENDOCRINE NEGATIVE: 1
NEUROLOGICAL NEGATIVE: 1
GASTROINTESTINAL NEGATIVE: 1
PSYCHIATRIC NEGATIVE: 1
CARDIOVASCULAR NEGATIVE: 1
CONSTITUTIONAL NEGATIVE: 1
EYES NEGATIVE: 1

## 2024-12-02 ASSESSMENT — ACTIVITIES OF DAILY LIVING (ADL)
TAKING_MEDICATION: INDEPENDENT
DOING_HOUSEWORK: INDEPENDENT
BATHING: INDEPENDENT
DRESSING: INDEPENDENT
MANAGING_FINANCES: INDEPENDENT
GROCERY_SHOPPING: INDEPENDENT

## 2024-12-02 ASSESSMENT — PATIENT HEALTH QUESTIONNAIRE - PHQ9
2. FEELING DOWN, DEPRESSED OR HOPELESS: NOT AT ALL
SUM OF ALL RESPONSES TO PHQ9 QUESTIONS 1 AND 2: 0
1. LITTLE INTEREST OR PLEASURE IN DOING THINGS: NOT AT ALL

## 2024-12-02 NOTE — PROGRESS NOTES
Bri Bryant is here for her annual wellness visit.  She states that she is overall been feeling well.  She has no new complaints.  She continues on her meds noted without problems.  She sees her gynecologist regularly and also her endocrinologist for follow-up on hypothyroidism and her pituitary adenoma.  Colonoscopy is up-to-date.  Mammograms are up-to-date.    Patient ID: Annette Li is a 73 y.o. female who presents for Medicare Annual Wellness Visit Subsequent (AWV):    Problem List Items Addressed This Visit       Hypothyroidism    Hypertension    Hyperlipidemia    Vitamin D insufficiency    Never smoked cigarettes    Class 1 obesity with body mass index (BMI) of 30.0 to 30.9 in adult     Other Visit Diagnoses       Medicare annual wellness visit, subsequent               Past Medical History:   Diagnosis Date    Atherosclerotic heart disease of native coronary artery without angina pectoris     Coronary artery disease    Body mass index (BMI)30.0-30.9, adult     BMI 30.0-30.9,adult    Chronic sinusitis, unspecified 09/25/2017    Sinobronchitis    Personal history of other diseases of the nervous system and sense organs     History of sleep apnea    Personal history of other endocrine, nutritional and metabolic disease     History of hypothyroidism    Personal history of other endocrine, nutritional and metabolic disease     History of obesity    Unspecified asthma, uncomplicated ([facility]) 02/14/2018    Asthmatic bronchitis      Past Surgical History:   Procedure Laterality Date    CHOLECYSTECTOMY  07/31/2014    Cholecystectomy    OTHER SURGICAL HISTORY  07/31/2014    Oophorectomy Unilateral Left Side    OTHER SURGICAL HISTORY  02/01/2022    Gallbladder surgery    OTHER SURGICAL HISTORY  03/25/2022    Complete colonoscopy    SQUAMOUS CELL CARCINOMA EXCISION Right 11/21/2024      Family History   Problem Relation Name Age of Onset    Heart block Mother      Hypertension Mother      Other (systemic lupus  erythematosus) Mother      Other (cardiac disorder) Father      Diabetes Father      Hypertension Father      Other (systemic lupus erthematossus) Father        Social History     Socioeconomic History    Marital status:      Spouse name: Not on file    Number of children: Not on file    Years of education: Not on file    Highest education level: Not on file   Occupational History    Not on file   Tobacco Use    Smoking status: Never    Smokeless tobacco: Never   Substance and Sexual Activity    Alcohol use: Yes     Comment: occassional    Drug use: Never    Sexual activity: Not on file   Other Topics Concern    Not on file   Social History Narrative    Not on file     Social Drivers of Health     Financial Resource Strain: Not on file   Food Insecurity: Not on file   Transportation Needs: Not on file   Physical Activity: Not on file   Stress: Not on file   Social Connections: Not on file   Intimate Partner Violence: Not on file   Housing Stability: Not on file      Divalproex and Morphine   Current Outpatient Medications   Medication Sig Dispense Refill    aspirin 81 mg EC tablet Take 1 tablet (81 mg) by mouth once daily.      atorvastatin (Lipitor) 10 mg tablet Take 0.5 tablets (5 mg) by mouth once daily. 45 tablet 1    cholecalciferol, vitamin D3, 250 mcg (10,000 unit) capsule Take by mouth. (Patient taking differently: Take by mouth. Tuesday and Thursdays)      cyanocobalamin (Vitamin B-12) 1,000 mcg tablet Take 1 tablet (1,000 mcg) by mouth once daily.      DULoxetine (Cymbalta) 30 mg DR capsule Take 1 twice daily 180 capsule 1    levothyroxine (Synthroid, Levoxyl) 100 mcg tablet One tab daiy Do not start before May 13, 2024. 90 tablet 3    lithium 150 mg capsule Take 4 at bedtime 360 capsule 1    losartan (Cozaar) 100 mg tablet Take 1 tablet (100 mg) by mouth once daily. 90 tablet 1    Macrobid 100 mg capsule Take 1 capsule (100 mg) by mouth once daily.      nitrofurantoin (Macrodantin) 50 mg capsule  Take 1 capsule (50 mg) by mouth once daily.       No current facility-administered medications for this visit.       Immunization History   Administered Date(s) Administered    Flu vaccine (IIV4), preservative free *Check age/dose* 09/13/2021    Flu vaccine, quadrivalent, high-dose, preservative free, age 65y+ (FLUZONE) 10/06/2022, 10/08/2023    Flu vaccine, trivalent, preservative free, HIGH-DOSE, age 65y+ (Fluzone) 09/26/2024    Influenza, Unspecified 10/01/2021    Influenza, seasonal, injectable 10/01/2020    Pfizer COVID-19 vaccine, 12 years and older, (30mcg/0.3mL) (Comirnaty) 10/30/2023    Pfizer COVID-19 vaccine, bivalent, age 12 years and older (30 mcg/0.3 mL) 10/06/2022    Pfizer Gray Cap SARS-CoV-2 06/24/2022    Pfizer Purple Cap SARS-CoV-2 02/22/2021, 03/13/2021, 04/02/2021, 11/05/2021, 11/17/2021, 04/13/2022    Pneumococcal conjugate vaccine, 13-valent (PREVNAR 13) 09/15/2017    Pneumococcal conjugate vaccine, 20-valent (PREVNAR 20) 05/13/2023    Pneumococcal polysaccharide vaccine, 23-valent, age 2 years and older (PNEUMOVAX 23) 10/15/2018    Tdap vaccine, age 7 year and older (BOOSTRIX, ADACEL) 11/04/2014    Zoster vaccine, recombinant, adult (SHINGRIX) 05/13/2023, 10/13/2023        Review of Systems   Constitutional: Negative.    HENT: Negative.     Eyes: Negative.    Respiratory: Negative.     Cardiovascular: Negative.    Gastrointestinal: Negative.    Endocrine: Negative.    Genitourinary: Negative.    Musculoskeletal: Negative.    Skin: Negative.    Allergic/Immunologic: Negative.    Neurological: Negative.    Hematological: Negative.    Psychiatric/Behavioral: Negative.     All other systems reviewed and are negative.       Vitals:    12/02/24 1034   BP: 150/85   Pulse: 58   Temp: 36.3 °C (97.3 °F)     Vitals:    12/02/24 1034   Weight: 84.9 kg (187 lb 3.2 oz)      Physical Exam  Constitutional:       General: She is not in acute distress.     Appearance: Normal appearance.   Neck:      Vascular:  No carotid bruit.   Cardiovascular:      Rate and Rhythm: Normal rate and regular rhythm.      Pulses: Normal pulses.      Heart sounds: Normal heart sounds. No murmur heard.     No friction rub. No gallop.   Pulmonary:      Effort: Pulmonary effort is normal. No respiratory distress.      Breath sounds: Normal breath sounds. No wheezing or rales.   Musculoskeletal:      Cervical back: Neck supple.   Neurological:      General: No focal deficit present.      Mental Status: She is alert and oriented to person, place, and time. Mental status is at baseline.   Psychiatric:         Mood and Affect: Mood normal.         Thought Content: Thought content normal.         Judgment: Judgment normal.          ASSESSMENT/PLAN: Annual wellness visit.  Labs as noted are up-to-date.  Eye examinations colonoscopy and mammograms are also up-to-date.  Recommended RSV vaccination and Tdap immunization update.    Hypertension with slightly elevated reading.  Monitor for now in light of previous normal blood pressures    Hyperlipidemia stable.  Continue atorvastatin daily    Hypothyroidism followed by endocrinology  Pituitary adenoma also followed by endocrinology    Bipolar disorder stable and managed by psychiatry      Exercise regularly  Follow-up 6 months and call as needed

## 2025-04-05 LAB
ANION GAP SERPL CALCULATED.4IONS-SCNC: 10 MMOL/L (CALC) (ref 7–17)
BUN SERPL-MCNC: 16 MG/DL (ref 7–25)
BUN/CREAT SERPL: NORMAL (CALC) (ref 6–22)
CALCIUM SERPL-MCNC: 9.6 MG/DL (ref 8.6–10.4)
CHLORIDE SERPL-SCNC: 105 MMOL/L (ref 98–110)
CO2 SERPL-SCNC: 25 MMOL/L (ref 20–32)
CREAT SERPL-MCNC: 0.77 MG/DL (ref 0.6–1)
EGFRCR SERPLBLD CKD-EPI 2021: 81 ML/MIN/1.73M2
GLUCOSE SERPL-MCNC: 91 MG/DL (ref 65–139)
LITHIUM SERPL-SCNC: 0.6 MMOL/L (ref 0.6–1.2)
POTASSIUM SERPL-SCNC: 4 MMOL/L (ref 3.5–5.3)
SODIUM SERPL-SCNC: 140 MMOL/L (ref 135–146)

## 2025-04-14 ENCOUNTER — TELEPHONE (OUTPATIENT)
Dept: BEHAVIORAL HEALTH | Facility: CLINIC | Age: 75
End: 2025-04-14
Payer: MEDICARE

## 2025-04-14 NOTE — PROGRESS NOTES
Made pt aware, per Dr. Sharp, that her lab results are unremarkable and no medication changes are recommended at this time.

## 2025-04-21 ENCOUNTER — APPOINTMENT (OUTPATIENT)
Dept: ENDOCRINOLOGY | Facility: CLINIC | Age: 75
End: 2025-04-21
Payer: MEDICARE

## 2025-04-25 ENCOUNTER — APPOINTMENT (OUTPATIENT)
Dept: ENDOCRINOLOGY | Facility: CLINIC | Age: 75
End: 2025-04-25
Payer: MEDICARE

## 2025-04-25 DIAGNOSIS — E03.9 HYPOTHYROIDISM, UNSPECIFIED TYPE: ICD-10-CM

## 2025-04-25 DIAGNOSIS — D35.2 PITUITARY MICROADENOMA (MULTI): Primary | ICD-10-CM

## 2025-04-25 PROCEDURE — 99213 OFFICE O/P EST LOW 20 MIN: CPT | Performed by: STUDENT IN AN ORGANIZED HEALTH CARE EDUCATION/TRAINING PROGRAM

## 2025-04-25 PROCEDURE — G2211 COMPLEX E/M VISIT ADD ON: HCPCS | Performed by: STUDENT IN AN ORGANIZED HEALTH CARE EDUCATION/TRAINING PROGRAM

## 2025-04-25 PROCEDURE — 1159F MED LIST DOCD IN RCRD: CPT | Performed by: STUDENT IN AN ORGANIZED HEALTH CARE EDUCATION/TRAINING PROGRAM

## 2025-04-25 PROCEDURE — 1123F ACP DISCUSS/DSCN MKR DOCD: CPT | Performed by: STUDENT IN AN ORGANIZED HEALTH CARE EDUCATION/TRAINING PROGRAM

## 2025-04-25 RX ORDER — LEVOTHYROXINE SODIUM 100 UG/1
TABLET ORAL
Qty: 90 TABLET | Refills: 3 | Status: SHIPPED | OUTPATIENT
Start: 2025-04-25

## 2025-04-25 NOTE — PROGRESS NOTES
73 F PMH: pituitary macroadenoma, hypothyroidism, depression     Following up for thyroid and pituitary, previously seeing Dr. 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   Current regimen:   Levothyroxine 100mcg daily    On chronic lithium   No ultrasound or FNA in the past     Generally feels well with no specific complaints   Has poor sleep at night which is baseline   No activity intolerance  Weight stable  ROS reviewed and is negative except for pertinent findings noted on HPI          Past Medical History:   Diagnosis Date    Atherosclerotic heart disease of native coronary artery without angina pectoris     Coronary artery disease    Body mass index (BMI)30.0-30.9, adult     BMI 30.0-30.9,adult    Chronic sinusitis, unspecified 09/25/2017    Sinobronchitis    Personal history of other diseases of the nervous system and sense organs     History of sleep apnea    Personal history of other endocrine, nutritional and metabolic disease     History of hypothyroidism    Personal history of other endocrine, nutritional and metabolic disease     History of obesity    Unspecified asthma, uncomplicated ([facility] 02/14/2018    Asthmatic bronchitis      Social History     Socioeconomic History    Marital status:      Spouse name: Not on file    Number of children: Not on file    Years of education: Not on file    Highest education level: Not on file   Occupational History    Not on file   Tobacco Use    Smoking status: Never    Smokeless tobacco: Never   Substance and Sexual Activity    Alcohol use: Yes     Comment: occassional    Drug use: Never    Sexual activity: Not  on file   Other Topics Concern    Not on file   Social History Narrative    Not on file     Social Drivers of Health     Financial Resource Strain: Not on file   Food Insecurity: Not on file   Transportation Needs: Not on file   Physical Activity: Not on file   Stress: Not on file   Social Connections: Not on file   Intimate Partner Violence: Not on file   Housing Stability: Not on file      Family History   Problem Relation Name Age of Onset    Heart block Mother      Hypertension Mother      Other (systemic lupus erythematosus) Mother      Other (cardiac disorder) Father      Diabetes Father      Hypertension Father      Other (systemic lupus erthematossus) Father          ROS reviewed and is negative except for pertinent findings noted on HPI    Physical Exam  Constitutional:       Comments: Not in distress, no gross focal deficit, aaox3         labs and imaging reviewed, pertinent findings listed on HPI and Impression      Problem List Items Addressed This Visit       Hypothyroidism    Relevant Medications    levothyroxine (Synthroid, Levoxyl) 100 mcg tablet    Other Relevant Orders    Thyroid Stimulating Hormone    Thyroxine, Free     Other Visit Diagnoses         Pituitary microadenoma (Multi)    -  Primary    Relevant Orders    Adrenocorticotropic Hormone (ACTH)    Cortisol    FSH & LH    Prolactin    DHEA-Sulfate             1) Pituitary microadeoma non funcitonal  No need for repeat imaging unless there is a change in clinical status  Pituitary labs now    2) continue levothryoxine 100mcg daily    Follow up in one year

## 2025-05-12 ENCOUNTER — APPOINTMENT (OUTPATIENT)
Dept: BEHAVIORAL HEALTH | Facility: CLINIC | Age: 75
End: 2025-05-12
Payer: MEDICARE

## 2025-05-17 LAB
ACTH PLAS-MCNC: 20 PG/ML (ref 6–50)
CORTIS SERPL-MCNC: 23.7 MCG/DL
DHEA-S SERPL-MCNC: 36 MCG/DL (ref 4–157)
FSH SERPL-ACNC: 137.6 MIU/ML
LH SERPL-ACNC: 53.7 MIU/ML
PROLACTIN SERPL-MCNC: 11.9 NG/ML
T4 FREE SERPL-MCNC: 1.1 NG/DL (ref 0.8–1.8)
TSH SERPL-ACNC: 1.26 MIU/L (ref 0.4–4.5)

## 2025-06-02 ENCOUNTER — APPOINTMENT (OUTPATIENT)
Dept: PRIMARY CARE | Facility: CLINIC | Age: 75
End: 2025-06-02
Payer: MEDICARE

## 2025-06-02 VITALS
SYSTOLIC BLOOD PRESSURE: 171 MMHG | HEIGHT: 66 IN | HEART RATE: 68 BPM | TEMPERATURE: 98.5 F | WEIGHT: 185 LBS | DIASTOLIC BLOOD PRESSURE: 88 MMHG | BODY MASS INDEX: 29.73 KG/M2

## 2025-06-02 DIAGNOSIS — Z78.9 NEVER SMOKED CIGARETTES: ICD-10-CM

## 2025-06-02 DIAGNOSIS — E66.3 OVERWEIGHT WITH BODY MASS INDEX (BMI) OF 29 TO 29.9 IN ADULT: ICD-10-CM

## 2025-06-02 DIAGNOSIS — E78.5 HYPERLIPIDEMIA, UNSPECIFIED HYPERLIPIDEMIA TYPE: ICD-10-CM

## 2025-06-02 DIAGNOSIS — I10 HYPERTENSION, UNSPECIFIED TYPE: ICD-10-CM

## 2025-06-02 DIAGNOSIS — E03.9 ACQUIRED HYPOTHYROIDISM: ICD-10-CM

## 2025-06-02 PROCEDURE — 3079F DIAST BP 80-89 MM HG: CPT | Performed by: FAMILY MEDICINE

## 2025-06-02 PROCEDURE — 1036F TOBACCO NON-USER: CPT | Performed by: FAMILY MEDICINE

## 2025-06-02 PROCEDURE — 1158F ADVNC CARE PLAN TLK DOCD: CPT | Performed by: FAMILY MEDICINE

## 2025-06-02 PROCEDURE — 99213 OFFICE O/P EST LOW 20 MIN: CPT | Performed by: FAMILY MEDICINE

## 2025-06-02 PROCEDURE — 1159F MED LIST DOCD IN RCRD: CPT | Performed by: FAMILY MEDICINE

## 2025-06-02 PROCEDURE — 3008F BODY MASS INDEX DOCD: CPT | Performed by: FAMILY MEDICINE

## 2025-06-02 PROCEDURE — 3077F SYST BP >= 140 MM HG: CPT | Performed by: FAMILY MEDICINE

## 2025-06-02 PROCEDURE — 1160F RVW MEDS BY RX/DR IN RCRD: CPT | Performed by: FAMILY MEDICINE

## 2025-06-02 RX ORDER — AMLODIPINE BESYLATE 5 MG/1
5 TABLET ORAL DAILY
Qty: 90 TABLET | Refills: 3 | Status: SHIPPED | OUTPATIENT
Start: 2025-06-02

## 2025-06-02 ASSESSMENT — ENCOUNTER SYMPTOMS
MUSCULOSKELETAL NEGATIVE: 1
RESPIRATORY NEGATIVE: 1
NEUROLOGICAL NEGATIVE: 1
EYES NEGATIVE: 1
PSYCHIATRIC NEGATIVE: 1
ALLERGIC/IMMUNOLOGIC NEGATIVE: 1
CARDIOVASCULAR NEGATIVE: 1
GASTROINTESTINAL NEGATIVE: 1
CONSTITUTIONAL NEGATIVE: 1
ENDOCRINE NEGATIVE: 1
HEMATOLOGIC/LYMPHATIC NEGATIVE: 1

## 2025-06-02 ASSESSMENT — PATIENT HEALTH QUESTIONNAIRE - PHQ9
1. LITTLE INTEREST OR PLEASURE IN DOING THINGS: NOT AT ALL
2. FEELING DOWN, DEPRESSED OR HOPELESS: NOT AT ALL
SUM OF ALL RESPONSES TO PHQ9 QUESTIONS 1 AND 2: 0

## 2025-06-02 NOTE — PROGRESS NOTES
Bri Bryant is here for follow-up on hypertension and hyperlipidemia.  She states that she has overall been feeling well.  She continues on her medications as noted.  Her home blood pressures have been in the 140s over 70s and 80s.  She states that she takes her losartan on a daily basis.  She does not eat much salt in her diet.  She does drink 1 glass of wine every day with dinner.  She also has been under a lot of stress.  She continues to see  Endocrinology for follow-up on her pituitary adenoma and hypothyroidism.  She also follows up with psychiatry for treatment of her bipolar disorder.  She has noted some slight swelling in her ankles over the last few weeks left greater than right.    Patient ID: Annette Li is a 74 y.o. female who presents for Follow-up (6m follow up):    Problem List Items Addressed This Visit    None     Medical History[1]   Surgical History[2]   Family History[3]   Social History     Socioeconomic History    Marital status:      Spouse name: Not on file    Number of children: Not on file    Years of education: Not on file    Highest education level: Not on file   Occupational History    Not on file   Tobacco Use    Smoking status: Never    Smokeless tobacco: Never   Substance and Sexual Activity    Alcohol use: Yes     Comment: occassional    Drug use: Never    Sexual activity: Not on file   Other Topics Concern    Not on file   Social History Narrative    Not on file     Social Drivers of Health     Financial Resource Strain: Not on file   Food Insecurity: Not on file   Transportation Needs: Not on file   Physical Activity: Not on file   Stress: Not on file   Social Connections: Not on file   Intimate Partner Violence: Not on file   Housing Stability: Not on file      Divalproex and Morphine   Current Medications[4]    Immunization History   Administered Date(s) Administered    COVID-19, mRNA, LNP-S, PF, 30 mcg/0.3 mL dose 02/22/2021, 03/13/2021    Flu vaccine (IIV4),  preservative free *Check age/dose* 09/13/2021    Flu vaccine, quadrivalent, high-dose, preservative free, age 65y+ (FLUZONE) 10/06/2022, 10/08/2023    Flu vaccine, trivalent, preservative free, HIGH-DOSE, age 65y+ (Fluzone) 09/26/2024    Influenza, Unspecified 10/01/2021    Influenza, seasonal, injectable 10/01/2020    Pfizer COVID-19 vaccine, 12 years and older, (30mcg/0.3mL) (Comirnaty) 10/30/2023    Pfizer COVID-19 vaccine, bivalent, age 12 years and older (30 mcg/0.3 mL) 10/06/2022    Pfizer Gray Cap SARS-CoV-2 06/24/2022    Pfizer Purple Cap SARS-CoV-2 03/12/2021, 04/02/2021, 11/05/2021, 11/17/2021, 04/13/2022    Pneumococcal conjugate vaccine, 13-valent (PREVNAR 13) 09/15/2017    Pneumococcal conjugate vaccine, 20-valent (PREVNAR 20) 05/13/2023    Pneumococcal polysaccharide vaccine, 23-valent, age 2 years and older (PNEUMOVAX 23) 10/15/2018    Tdap vaccine, age 7 year and older (BOOSTRIX, ADACEL) 11/04/2014    Zoster vaccine, recombinant, adult (SHINGRIX) 05/13/2023, 10/13/2023        Review of Systems   Constitutional: Negative.    HENT: Negative.     Eyes: Negative.    Respiratory: Negative.     Cardiovascular: Negative.    Gastrointestinal: Negative.    Endocrine: Negative.    Genitourinary: Negative.    Musculoskeletal: Negative.    Skin: Negative.    Allergic/Immunologic: Negative.    Neurological: Negative.    Hematological: Negative.    Psychiatric/Behavioral: Negative.     All other systems reviewed and are negative.       Vitals:    06/02/25 1026   BP: 179/85   Pulse: 71   Temp: 36.9 °C (98.5 °F)     Vitals:    06/02/25 1026   Weight: 83.9 kg (185 lb)      Physical Exam  Constitutional:       General: She is not in acute distress.     Appearance: Normal appearance.   Neck:      Vascular: No carotid bruit.   Cardiovascular:      Rate and Rhythm: Normal rate and regular rhythm.      Pulses: Normal pulses.      Heart sounds: Normal heart sounds. No murmur heard.     No friction rub. No gallop.    Pulmonary:      Effort: Pulmonary effort is normal. No respiratory distress.      Breath sounds: Normal breath sounds. No wheezing or rales.   Musculoskeletal:      Cervical back: Neck supple.   Neurological:      General: No focal deficit present.      Mental Status: She is alert and oriented to person, place, and time. Mental status is at baseline.   Psychiatric:         Mood and Affect: Mood normal.         Thought Content: Thought content normal.         Judgment: Judgment normal.     There is mild edema in both lower legs and feet  left greater than right.  There is no redness.    ASSESSMENT/PLAN: Hypertension with elevated reading.  BP elevated on second recheck as noted.  Continue losartan 100 mg daily.  Amlodipine 5 mg daily.  We discussed side effects to watch out for.  We also discussed the importance of decreasing her alcohol intake.  Also monitor salt in diet.  Continue home blood pressure checks.    Hyperlipidemia.  Check CMP and lipid profile.  Continue atorvastatin daily.    Hypothyroid followed by endocrinology.  Continue levothyroxine as noted.  Pituitary adenoma also followed by endocrinology    Bipolar disorder followed by psychiatry.  Continue meds as noted    Also check CBC with above labs    Follow-up for recheck especially blood pressure in 2 months              [1]   Past Medical History:  Diagnosis Date    Atherosclerotic heart disease of native coronary artery without angina pectoris     Coronary artery disease    Body mass index (BMI)30.0-30.9, adult     BMI 30.0-30.9,adult    Chronic sinusitis, unspecified 09/25/2017    Sinobronchitis    Personal history of other diseases of the nervous system and sense organs     History of sleep apnea    Personal history of other endocrine, nutritional and metabolic disease     History of hypothyroidism    Personal history of other endocrine, nutritional and metabolic disease     History of obesity    Unspecified asthma, uncomplicated ([facility])  02/14/2018    Asthmatic bronchitis   [2]   Past Surgical History:  Procedure Laterality Date    CHOLECYSTECTOMY  07/31/2014    Cholecystectomy    OTHER SURGICAL HISTORY  07/31/2014    Oophorectomy Unilateral Left Side    OTHER SURGICAL HISTORY  02/01/2022    Gallbladder surgery    OTHER SURGICAL HISTORY  03/25/2022    Complete colonoscopy    SQUAMOUS CELL CARCINOMA EXCISION Right 11/21/2024   [3]   Family History  Problem Relation Name Age of Onset    Heart block Mother      Hypertension Mother      Other (systemic lupus erythematosus) Mother      Other (cardiac disorder) Father      Diabetes Father      Hypertension Father      Other (systemic lupus erthematossus) Father     [4]   Current Outpatient Medications   Medication Sig Dispense Refill    aspirin 81 mg EC tablet Take 1 tablet (81 mg) by mouth once daily.      atorvastatin (Lipitor) 10 mg tablet Take 0.5 tablets (5 mg) by mouth once daily. 45 tablet 3    cholecalciferol, vitamin D3, 250 mcg (10,000 unit) capsule Tuesday and Thursdays 24 capsule 3    cyanocobalamin (Vitamin B-12) 1,000 mcg tablet Take 1 tablet (1,000 mcg) by mouth once daily.      DULoxetine (Cymbalta) 30 mg DR capsule TAKE 1 CAPSULE BY MOUTH TWICE DAILY 180 capsule 0    levothyroxine (Synthroid, Levoxyl) 100 mcg tablet One tab daiy 90 tablet 3    lithium 150 mg capsule TAKE 4 CAPSULES BY MOUTH AT BEDTIME 360 capsule 0    losartan (Cozaar) 100 mg tablet Take 1 tablet (100 mg) by mouth once daily. 90 tablet 3    nitrofurantoin (Macrodantin) 50 mg capsule Take 1 capsule (50 mg) by mouth once daily.      Macrobid 100 mg capsule Take 1 capsule (100 mg) by mouth once daily. (Patient not taking: Reported on 6/2/2025)       No current facility-administered medications for this visit.

## 2025-06-02 NOTE — PATIENT INSTRUCTIONS
Begin amlodipine 5 mg daily  Continue monitoring home blood pressures   Obtain labs in the next few weeks  Return 2 months

## 2025-06-23 ENCOUNTER — APPOINTMENT (OUTPATIENT)
Dept: BEHAVIORAL HEALTH | Facility: CLINIC | Age: 75
End: 2025-06-23
Payer: MEDICARE

## 2025-06-23 VITALS
TEMPERATURE: 97.5 F | SYSTOLIC BLOOD PRESSURE: 144 MMHG | DIASTOLIC BLOOD PRESSURE: 83 MMHG | BODY MASS INDEX: 29.38 KG/M2 | RESPIRATION RATE: 16 BRPM | WEIGHT: 182 LBS | HEART RATE: 69 BPM

## 2025-06-23 DIAGNOSIS — F31.30 BIPOLAR AFFECTIVE DISORDER, CURRENT EPISODE DEPRESSED, CURRENT EPISODE SEVERITY UNSPECIFIED (MULTI): ICD-10-CM

## 2025-06-23 PROCEDURE — 1126F AMNT PAIN NOTED NONE PRSNT: CPT | Performed by: PSYCHIATRY & NEUROLOGY

## 2025-06-23 PROCEDURE — 3077F SYST BP >= 140 MM HG: CPT | Performed by: PSYCHIATRY & NEUROLOGY

## 2025-06-23 PROCEDURE — 3079F DIAST BP 80-89 MM HG: CPT | Performed by: PSYCHIATRY & NEUROLOGY

## 2025-06-23 PROCEDURE — 1159F MED LIST DOCD IN RCRD: CPT | Performed by: PSYCHIATRY & NEUROLOGY

## 2025-06-23 PROCEDURE — 99214 OFFICE O/P EST MOD 30 MIN: CPT | Performed by: PSYCHIATRY & NEUROLOGY

## 2025-06-23 RX ORDER — DULOXETIN HYDROCHLORIDE 30 MG/1
CAPSULE, DELAYED RELEASE ORAL
Qty: 180 CAPSULE | Refills: 0 | Status: SHIPPED | OUTPATIENT
Start: 2025-06-23

## 2025-06-23 RX ORDER — LITHIUM CARBONATE 150 MG/1
600 CAPSULE ORAL NIGHTLY
Qty: 360 CAPSULE | Refills: 0 | Status: SHIPPED | OUTPATIENT
Start: 2025-06-23

## 2025-06-23 ASSESSMENT — PAIN SCALES - GENERAL: PAINLEVEL_OUTOF10: 0-NO PAIN

## 2025-06-23 NOTE — PROGRESS NOTES
HPI: Ms. Li is assessed today in person. She accompanied by her , Tim. She  feels that her mood is good. She was last seen by me in November of 2024 and we stopped her Abilify. Her  feels she is doing well and that her mood has been good.  She had blood work ordered by me in April . Her serum lithium level is within good range  for her age(0.6). Kidney and renal functioning are both good. GFR=  81    She describes her mood as an 8 or 9 on a 1-10 scale (10 is best). Her  concurs.       ROS:  She saw her PCP, Dr. Hall recently.  Her PCP added amlodipine for a few weeks to attempt to get better management of her BP.  She followed up with dermatology re her skin cancer. No additional treatment are recommend and she is slated to see her dermatologist every 6 months.   Sleeping is “pretty good”  She has good appetite  No falls  Energy level is “could be better”  Had TSH ordered (normal results) in May [address]63.  I have reviewed this result    Appetite: XX Normal/Unchanged  __Increase  _Decrease  SI:  ___ Present  XXAbsent  Sleep: XNormal/Unchanged  __ Increase _Decrease  HI:  ___ Present  XX Absent  Energy: __ Normal/Unchanged  __ Increase XX Decrease  Plan:  ___ Present  XX Absent  Patient is: ___ able ___ not able to contract for safety  XXN/A Aggression:   ___ Yes       XX No  Medication Side Effects (SE):  __ None (Psych. Meds.)  ___ Other ___    Explain positives below.     Constitutional :   Eyes            Pos___  Ears/Nose/Mouth/Throat     Pos__________  CV          Pos__________  Respiratory          Pos__________  GI        Pos__________       Pos_   Musculoskeletal        Pos. Still getting cortisone shots for the  chronic pain in her shoulder.  Gets shots every couple of months. Last injection was a week ago  Skin/Breast         Pos__________  Neurological         Pos__________  Endocrine      Pos__________  Heme/Lymph         Pos__________  Allergic  Immunologic   Pos__________      Vital signs today:  6/23/2025     12:35 PM      /83   Patient Position Sitting   Heart Rate 69   Temp 36.4 °C (97.5 °F)   Temp Source Temporal   Weight 82.6 kg (182 lb)   Resp 16   Pain Score 0-No pain           Medications:  Lithium 150 mg. She is taking 4 tablets at bedtime  Duloxetine 30 mg twice daily  Abilify 2 mg three times/week      MSE:  General impression: Alert/bright spontaneous. Engaged in the interview.   Speech: clear, coherent. Her speech is normal rate/rhythm  Cognition: grossly intact.   Mood: Good/upbeat.   Future oriented. Describes her mood as an “8 or 9” on a 1-10 scale (10 is the best)  Thought form/content: no psychotic symptoms. She denies hallucinations. Thinking is linear and goal-oriented  Cognition: Grossly intact  Fund of knowledge: excellent  Insight: good  Mrs. ROJAS does not appear to be an acute risk of harm to self or others. She has no thoughts that life is not worth living.      TREATMENT/PLAN: XX Continue Current Medications.  . ___Continue Follow-Up ___Continue Labs ___ Other    PATIENT RESPONSE TO TREATMENT: Excellent response to lithium. We stopped her Abilify in November. She did experience “a little dip” but more recently feels her mood is good/even  Risks, benefits, Side Effects, Drug-to-Drug Interactions and Alternatives to treatment were discussed in my usual manner: ____ XXYes ____No    Reason for not reducing medication dose(s):       ___XX_N/A __ High risk of patient's deterioration ___ Medication recently reduced      ___Prior Medication Dose Reduction Unsuccessful ____Other     Complexity Issues:   # of diagnoses or management options:  ____Limited   XXMultiple     Problems: (gait, hearing, vision, etc.) effect on treatment and management:   ____Yes      XX No    Risk of complications and/or morbidity or mortality: ____None    ____ Limited   XX Moderate  ____  Severe    Topics discussed:   X_Nature of diagnosis and/or prognosis                                                            X  Medical records reviewed  X_Aspects of aging process and relationship to the current problem               ___Communication with patient's Dr Zhong of possible treatment options/drug drug interaction                         ___Communication with facility staff  X_Risk of non-treatment                                                                                   _X_Communication with family/caregiver  __X_Psychopharmacologic treatment options/possible benefits and risks           ___Referred for psychotherapy  ___Nature of, reasons for and possible benefits from psychotherapy               ___ Forms/reports filled out  ___Family and/or situational stressors                                                               ___Other  X Behavioral and/or environmental changed that might help      Impression:  Type 1 Bipolar disorder with euthymic mood. She has done well with stopping her Abilify about 7 months ago.  She appears to be doing relatively well.  She needs a lithium level and would like to get this done when she gets the metabolic panel ordered by her PCP. She had normal TSH in May.  I will order the lithium level today and then repeat this when I see her again in late 2025 (pending normal results now) Total visit today 30 mg/day      Plan for Mrs. Annette Li as  June 23, 2025:   Continue duloxetine (Cymbalta) 30 mg twice daily  Continue lithium 150 mg/Take 4 tablets in the evening/bedtime   Get blood work done: lithium level  Your PCP has already ordered a metabolic panel and you had thyroid testing in May  Follow up with Dr. Sharp in 5 months for an in-person visit. I will order a new lithium level after I see you in late 2025

## 2025-07-02 LAB
ALBUMIN SERPL-MCNC: 4.1 G/DL (ref 3.6–5.1)
ALP SERPL-CCNC: 42 U/L (ref 37–153)
ALT SERPL-CCNC: 18 U/L (ref 6–29)
ANION GAP SERPL CALCULATED.4IONS-SCNC: 8 MMOL/L (CALC) (ref 7–17)
AST SERPL-CCNC: 15 U/L (ref 10–35)
BILIRUB SERPL-MCNC: 1.2 MG/DL (ref 0.2–1.2)
BUN SERPL-MCNC: 16 MG/DL (ref 7–25)
CALCIUM SERPL-MCNC: 9.5 MG/DL (ref 8.6–10.4)
CHLORIDE SERPL-SCNC: 108 MMOL/L (ref 98–110)
CHOLEST SERPL-MCNC: 175 MG/DL
CHOLEST/HDLC SERPL: 2 (CALC)
CO2 SERPL-SCNC: 26 MMOL/L (ref 20–32)
CREAT SERPL-MCNC: 0.72 MG/DL (ref 0.6–1)
EGFRCR SERPLBLD CKD-EPI 2021: 88 ML/MIN/1.73M2
ERYTHROCYTE [DISTWIDTH] IN BLOOD BY AUTOMATED COUNT: 13.3 % (ref 11–15)
GLUCOSE SERPL-MCNC: 91 MG/DL (ref 65–99)
HCT VFR BLD AUTO: 38.2 % (ref 35–45)
HDLC SERPL-MCNC: 89 MG/DL
HGB BLD-MCNC: 12.2 G/DL (ref 11.7–15.5)
LDLC SERPL CALC-MCNC: 72 MG/DL (CALC)
LITHIUM SERPL-SCNC: 0.7 MMOL/L (ref 0.6–1.2)
MCH RBC QN AUTO: 33.3 PG (ref 27–33)
MCHC RBC AUTO-ENTMCNC: 31.9 G/DL (ref 32–36)
MCV RBC AUTO: 104.4 FL (ref 80–100)
NONHDLC SERPL-MCNC: 86 MG/DL (CALC)
PLATELET # BLD AUTO: 162 THOUSAND/UL (ref 140–400)
PMV BLD REES-ECKER: 11.4 FL (ref 7.5–12.5)
POTASSIUM SERPL-SCNC: 4.2 MMOL/L (ref 3.5–5.3)
PROT SERPL-MCNC: 6.3 G/DL (ref 6.1–8.1)
RBC # BLD AUTO: 3.66 MILLION/UL (ref 3.8–5.1)
SODIUM SERPL-SCNC: 142 MMOL/L (ref 135–146)
TRIGL SERPL-MCNC: 67 MG/DL
WBC # BLD AUTO: 4.5 THOUSAND/UL (ref 3.8–10.8)

## 2025-07-07 ENCOUNTER — TELEPHONE (OUTPATIENT)
Dept: PRIMARY CARE | Facility: CLINIC | Age: 75
End: 2025-07-07
Payer: MEDICARE

## 2025-08-04 ENCOUNTER — APPOINTMENT (OUTPATIENT)
Dept: PRIMARY CARE | Facility: CLINIC | Age: 75
End: 2025-08-04
Payer: MEDICARE

## 2025-08-27 ENCOUNTER — APPOINTMENT (OUTPATIENT)
Dept: PRIMARY CARE | Facility: CLINIC | Age: 75
End: 2025-08-27
Payer: MEDICARE

## 2025-08-27 VITALS
HEIGHT: 66 IN | HEART RATE: 69 BPM | SYSTOLIC BLOOD PRESSURE: 136 MMHG | WEIGHT: 182.8 LBS | BODY MASS INDEX: 29.38 KG/M2 | DIASTOLIC BLOOD PRESSURE: 89 MMHG | TEMPERATURE: 98.2 F

## 2025-08-27 DIAGNOSIS — I10 HYPERTENSION, UNSPECIFIED TYPE: ICD-10-CM

## 2025-08-27 DIAGNOSIS — E03.9 ACQUIRED HYPOTHYROIDISM: ICD-10-CM

## 2025-08-27 DIAGNOSIS — Z00.00 HEALTHCARE MAINTENANCE: ICD-10-CM

## 2025-08-27 DIAGNOSIS — Z78.9 NEVER SMOKED CIGARETTES: ICD-10-CM

## 2025-08-27 DIAGNOSIS — E78.5 HYPERLIPIDEMIA, UNSPECIFIED HYPERLIPIDEMIA TYPE: ICD-10-CM

## 2025-08-27 DIAGNOSIS — E66.3 OVERWEIGHT WITH BODY MASS INDEX (BMI) OF 29 TO 29.9 IN ADULT: ICD-10-CM

## 2025-08-27 PROCEDURE — 1160F RVW MEDS BY RX/DR IN RCRD: CPT | Performed by: FAMILY MEDICINE

## 2025-08-27 PROCEDURE — 1036F TOBACCO NON-USER: CPT | Performed by: FAMILY MEDICINE

## 2025-08-27 PROCEDURE — 99213 OFFICE O/P EST LOW 20 MIN: CPT | Performed by: FAMILY MEDICINE

## 2025-08-27 PROCEDURE — 3079F DIAST BP 80-89 MM HG: CPT | Performed by: FAMILY MEDICINE

## 2025-08-27 PROCEDURE — 3008F BODY MASS INDEX DOCD: CPT | Performed by: FAMILY MEDICINE

## 2025-08-27 PROCEDURE — 1159F MED LIST DOCD IN RCRD: CPT | Performed by: FAMILY MEDICINE

## 2025-08-27 PROCEDURE — 3075F SYST BP GE 130 - 139MM HG: CPT | Performed by: FAMILY MEDICINE

## 2025-11-24 ENCOUNTER — APPOINTMENT (OUTPATIENT)
Dept: BEHAVIORAL HEALTH | Facility: CLINIC | Age: 75
End: 2025-11-24
Payer: MEDICARE

## 2025-12-17 ENCOUNTER — APPOINTMENT (OUTPATIENT)
Dept: PRIMARY CARE | Facility: CLINIC | Age: 75
End: 2025-12-17
Payer: MEDICARE

## 2026-04-29 ENCOUNTER — APPOINTMENT (OUTPATIENT)
Dept: ENDOCRINOLOGY | Facility: CLINIC | Age: 76
End: 2026-04-29
Payer: MEDICARE